# Patient Record
Sex: FEMALE | Race: WHITE | NOT HISPANIC OR LATINO
[De-identification: names, ages, dates, MRNs, and addresses within clinical notes are randomized per-mention and may not be internally consistent; named-entity substitution may affect disease eponyms.]

---

## 2021-04-02 PROBLEM — Z00.00 ENCOUNTER FOR PREVENTIVE HEALTH EXAMINATION: Status: ACTIVE | Noted: 2021-04-02

## 2021-04-06 ENCOUNTER — APPOINTMENT (OUTPATIENT)
Dept: PLASTIC SURGERY | Facility: CLINIC | Age: 61
End: 2021-04-06
Payer: SELF-PAY

## 2021-04-06 VITALS
DIASTOLIC BLOOD PRESSURE: 80 MMHG | HEIGHT: 66 IN | RESPIRATION RATE: 15 BRPM | WEIGHT: 154 LBS | SYSTOLIC BLOOD PRESSURE: 118 MMHG | TEMPERATURE: 97.7 F | HEART RATE: 78 BPM | OXYGEN SATURATION: 97 % | BODY MASS INDEX: 24.75 KG/M2

## 2021-04-06 DIAGNOSIS — F41.9 ANXIETY DISORDER, UNSPECIFIED: ICD-10-CM

## 2021-04-06 DIAGNOSIS — Z84.89 FAMILY HISTORY OF OTHER SPECIFIED CONDITIONS: ICD-10-CM

## 2021-04-06 PROCEDURE — 99203 OFFICE O/P NEW LOW 30 MIN: CPT | Mod: 25

## 2021-04-06 RX ORDER — BIOTIN 10 MG
TABLET ORAL
Refills: 0 | Status: ACTIVE | COMMUNITY

## 2021-04-06 NOTE — SURGICAL HISTORY
[de-identified] : 1992 - Left breast reduction for symmetry done in [place] [de-identified] : 1992 - RT foot bunionectomy done in [place] [de-identified] : 1994 -  section done in [place] [de-identified] : 1995 - LT knee repair of torn ACL done in [place] [de-identified] :  -  section done in [place]

## 2021-04-06 NOTE — ASSESSMENT
[FreeTextEntry1] : Aging face.\par We discussed the R/B/A of Botos injections, including but not limited to ptosis, difficulty speaking and eating and bruising. She gave her informed consent and wished to proceed today.\par Botox injected today.\par Will plan filler to the lateral eye depressions and upper lip border on the next visit to avoid having the filler displace by muscle movement.\par Would see pt on 4/20, however, pt notes she has her second vaccine for COVID on that day. Because the vaccine response can lead to swelling at filler sites, I would delay that visit another 2 weeks.

## 2021-04-06 NOTE — PROCEDURE
[Tolerated Well] : Post Procedure: the patient tolerated the procedure well [FreeTextEntry1] : Aging face [FreeTextEntry2] : Botulinum toxin injection [FreeTextEntry3] : ice packs [FreeTextEntry4] : none [FreeTextEntry5] : none [FreeTextEntry6] : After ice pack analgesia and alcohol prep, the forehead, crow's feet and upper lip were treated.\par Forehead about 17 units\par Crow's feet about 9 units on each side\par Upper lip about 4 units divided over 4 sites.\par \par Lot [id number], Exp 2/22 [de-identified] : Some pain with the upper lip injections

## 2021-04-06 NOTE — REASON FOR VISIT
[Consultation] : a consultation visit [FreeTextEntry1] : discuss possible Botox/fillers.  Patient's areas of concern are underneath her eyes, her forehead, and upper lip.

## 2021-04-06 NOTE — PHYSICAL EXAM
[NI] : Normal [de-identified] : Bilateral crow's feet, left greater than right.\par Left lateral soft tissue deficit. [de-identified] : Horizontal forehead creases.\par Minimal glabellar creases.\par Vertical upper lip creases, 3 on the left and 2 on the right.

## 2021-05-04 ENCOUNTER — APPOINTMENT (OUTPATIENT)
Dept: PLASTIC SURGERY | Facility: CLINIC | Age: 61
End: 2021-05-04
Payer: SELF-PAY

## 2021-05-04 VITALS
OXYGEN SATURATION: 96 % | WEIGHT: 150 LBS | RESPIRATION RATE: 15 BRPM | TEMPERATURE: 97.3 F | HEART RATE: 69 BPM | DIASTOLIC BLOOD PRESSURE: 64 MMHG | BODY MASS INDEX: 24.11 KG/M2 | SYSTOLIC BLOOD PRESSURE: 106 MMHG | HEIGHT: 66 IN

## 2021-05-04 PROCEDURE — 11950 SUBQ NJX FILLING MATRL 1CC/<: CPT

## 2021-05-07 RX ORDER — FLUOCINONIDE 0.5 MG/ML
0.05 SOLUTION TOPICAL
Qty: 60 | Refills: 0 | Status: ACTIVE | COMMUNITY
Start: 2021-03-31

## 2021-05-07 RX ORDER — ALPRAZOLAM 0.5 MG/1
0.5 TABLET ORAL
Qty: 30 | Refills: 0 | Status: ACTIVE | COMMUNITY
Start: 2021-03-29

## 2021-05-07 RX ORDER — VALACYCLOVIR 500 MG/1
500 TABLET, FILM COATED ORAL
Qty: 30 | Refills: 0 | Status: ACTIVE | COMMUNITY
Start: 2021-03-30

## 2021-05-07 RX ORDER — ESTRADIOL 0.1 MG/G
0.1 CREAM VAGINAL
Qty: 42 | Refills: 0 | Status: ACTIVE | COMMUNITY
Start: 2021-04-14

## 2021-05-07 RX ORDER — CLOTRIMAZOLE AND BETAMETHASONE DIPROPIONATE 10; .5 MG/G; MG/G
1-0.05 CREAM TOPICAL
Qty: 45 | Refills: 0 | Status: ACTIVE | COMMUNITY
Start: 2021-03-29

## 2021-05-07 NOTE — PROCEDURE
[FreeTextEntry1] : perioral rhytids, facial aging [FreeTextEntry2] : Upper lip Juvederm injection, 1 ml [FreeTextEntry3] : infra orbital nerve and lip block, lidocaine 1% with epi [FreeTextEntry6] : Following informed consent, an upper lip block was performed, targeting infraorbital branches to the lip.\par Upper lip lines improved following Botox, but creases still present.\par After alcohol swab prep, Juvederm ultra was injected along the white roll bilaterally and at the Cupid's bow peaks. The creased rhytids were injected intradermally in a linear fashion within the crease, and in a deep dermal plane, perpendicular to the crease in a cross-hatching pattern. The commissures were also raised with deep dermal and SQ injection in the lateral lower lip/inferior aspect of the commissure. \par The pt tolerated this well, especially with the block in the upper lip.\par \par No bruising or complications noted.\par Pt also reports she did have a bit of lip weakness, with difficulty saying "p" and "b" sounds for the first week or so after the Botox, but this resolved as expected.\par Follow up in 2-3 months to refresh Botox and check fillers.\par \par Juvederm Ultra XC 1 ml\par GTIN [id number]\par Lot [id number]\par Exp 2021-07-09\par

## 2021-05-07 NOTE — REASON FOR VISIT
[Procedure: _________] : a [unfilled] procedure visit [FreeTextEntry1] : Procedure for filler injections around mouth.

## 2021-05-11 ENCOUNTER — RESULT REVIEW (OUTPATIENT)
Age: 61
End: 2021-05-11

## 2021-05-11 ENCOUNTER — APPOINTMENT (OUTPATIENT)
Dept: BREAST CENTER | Facility: CLINIC | Age: 61
End: 2021-05-11
Payer: MEDICAID

## 2021-05-11 VITALS
WEIGHT: 158 LBS | TEMPERATURE: 97.3 F | OXYGEN SATURATION: 96 % | DIASTOLIC BLOOD PRESSURE: 60 MMHG | HEIGHT: 66 IN | BODY MASS INDEX: 25.39 KG/M2 | RESPIRATION RATE: 15 BRPM | SYSTOLIC BLOOD PRESSURE: 94 MMHG | HEART RATE: 70 BPM

## 2021-05-11 DIAGNOSIS — Z84.89 FAMILY HISTORY OF OTHER SPECIFIED CONDITIONS: ICD-10-CM

## 2021-05-11 PROCEDURE — 99072 ADDL SUPL MATRL&STAF TM PHE: CPT

## 2021-05-11 PROCEDURE — 99203 OFFICE O/P NEW LOW 30 MIN: CPT

## 2021-05-11 NOTE — DATA REVIEWED
[FreeTextEntry1] : 8/13/20, Daylin CLAIRE sMMG: het dense, no susp findings, No birads given.\par 3/30/21, DOUGLAS CLAIRE dMMG: het dense, approx 10-12:00 position close to nipple evidence for a focal asymm grafts 7 or 8mm dev from last mmg, BR3\par 3/30/21, DOUGLAS CLAIRE U/S: 8mm well circumscribed hypoechoic solid nodule 11:00 1N likely FA, area of interest, 2:00 4mm cyst 1N, 6:00 cluster of cysts 8mm 1N, BR3

## 2021-05-11 NOTE — CONSULT LETTER
[Dear  ___] : Dear  [unfilled], [Consult Letter:] : I had the pleasure of evaluating your patient, [unfilled]. [Please see my note below.] : Please see my note below. [Consult Closing:] : Thank you very much for allowing me to participate in the care of this patient.  If you have any questions, please do not hesitate to contact me. [___] : [unfilled]

## 2021-05-11 NOTE — PAST MEDICAL HISTORY
[Menarche Age ____] : age at menarche was [unfilled] [Menopause Age____] : age at menopause was [unfilled] [Total Preg ___] : G[unfilled] [Age At Live Birth ___] : Age at live birth: [unfilled] [History of Hormone Replacement Treatment] : has no history of hormone replacement treatment [FreeTextEntry7] : 10 years [FreeTextEntry8] : ~ 3 mos. each child

## 2021-05-11 NOTE — HISTORY OF PRESENT ILLNESS
[FreeTextEntry1] : Pt is a 61 year old whtie female referred for consultation by Viviane Hardy for abnormal U/S.\par MATT Chau (PCP)\par \par 8/13/20, Daylin CLAIRE sMMG: het dense, no susp findings, Negative, NO birads given.\par 3/30/21, DOUGLAS CLAIRE dMMG: het dense, approx 10-12:00 position close to nipple evidence for a focal asymm grafts 7 or 8mm dev from last mmg, BR3\par 3/30/21, ALETHEA, R U/S: 8mm well circumscribed hypoechoic solid nodule 11:00 1N likely FA, area of interest, 2:00 4mm cyst 1N, 6:00 cluster of cysts 8mm 1N, BR3\par \par Hx of L breast reduction due to asymmetry in [place] 1999.\par Fhx: Sister w/ inoperable brain tumor at 34 y/o. Not AJ. \par Pt felt nodular area at 11:00, a couple mos ago. ALso hx of right breast rash at the same time which has since resolved.  Denies other masses, lesions or skin changes.

## 2021-05-11 NOTE — PHYSICAL EXAM
[Normocephalic] : normocephalic [Atraumatic] : atraumatic [Supple] : supple [No Supraclavicular Adenopathy] : no supraclavicular adenopathy [No Thyromegaly] : no thyromegaly [Examined in the supine and seated position] : examined in the supine and seated position [No dominant masses] : no dominant masses in right breast  [No dominant masses] : no dominant masses left breast [No Nipple Retraction] : no left nipple retraction [No Nipple Discharge] : no left nipple discharge [No Axillary Lymphadenopathy] : no left axillary lymphadenopathy [No Edema] : no edema [No Rashes] : no rashes [No Ulceration] : no ulceration [Symmetrical] : symmetrical [No Swelling] : no swelling [Full ROM] : full range of motion [de-identified] : Left breast reduction scars.  [de-identified] : 11:00 1N, slightly nodular but no discrete masses, could corresp to the prob FA on u/s.  [de-identified] : Left reduction scars.

## 2021-05-11 NOTE — ASSESSMENT
[FreeTextEntry1] : Pt is a 61 year old whtie female referred for consultation by Viviane Hardy for abnormal U/S.\par MATT Chau (PCP)\par \par 8/13/20, ALETHEA, Daylin sMMG: het dense, no susp findings, Negative, NO birads given.\par 3/30/21, ALETHEA R dMMG: het dense, approx 10-12:00 position close to nipple evidence for a focal asymm grafts 7 or 8mm dev from last mmg, BR3\par 3/30/21, ALETHEA, R U/S: 8mm well circumscribed hypoechoic solid nodule 11:00 1N likely FA, area of interest, 2:00 4mm cyst 1N, 6:00 cluster of cysts 8mm 1N, BR3\par \par Hx of L breast reduction due to asymmetry in [place] 1999.\par Fhx: Sister w/ inoperable brain tumor at 32 y/o. Not AJ. \par Pt felt nodular area at 11:00, a couple mos ago. ALso hx of right breast rash at the same time which has since resolved.  Denies other masses, lesions or skin changes. \par CBE: Bilat FCC, Left reduction scar, RIght 11:00 slightly nodular area may correspond with prob FA on u/s. No discrete suspicious masses or lesions and no adenopathy.\par Reviewed studies with pt. PT with dense breasts and did not have left [place]/s. ALso on right prob benign lesions, BR3. Option given for f.u (can do at same time as screenings, due 8/21) or biopsy. Pt opts for the f.u. ALso will review recent u/s with radiology.\par

## 2021-06-29 NOTE — HISTORY OF PRESENT ILLNESS
[facility]  [address] [address]    Call us at [phone] if you have any questions about your wounds, have redness or swelling around your wound, have a fever of 101 or greater or if you have any other problems or concerns. We answer the phone Monday through Friday 8 am to 4 pm, please leave a message as we check the voicemail frequently throughout the day.     Nabil Cheung      1958    Key to healing your wound is to keeping your blood sugars under good control.  Medications/supplements to aid in healing:  Vitamin D3 10,000 iu per day  Ping C 1,000 mg take twice daily  Vitamin B Complex with zinc take 1 tablet daily  Vitamin B 12 1000 mcg daily  ----->Order from [facility] Hesperidin Diosmin 1,000 mg tablet twice daily - Vein Formula- take life long    Wound care recommendations to Left lateral Foot  Apply Endoform antimicrobial then mepilex to wound bed followed by spandage. Then apply total contact cast (TCC)    When you sit raise your ankle above your hips to promote wound healing.  You need to stay off your foot at all times to help heal your wound.  You should wear your offloading shoe at all times.  Use Knee roller to keep your weight off your foot.       Monica Hawk PA-C June 29, 2021    Return as scheduled  If you had a positive experience please indicate on your patient satisfaction survey form that [facility] will be sending you.    If you have any billing related questions please call the [facility] Business office at [phone]. The clinic staff does not handle billing related matters.     [FreeTextEntry1] : This is a 60 yo woman with no major PMH who presents for concerns regarding wrinkles on the face.\par She is most concerned with horizontal forehead lines, crow's feet and left lateral eye vertical line as well as vertical lines in her upper lip.\par She has had Botox before, 3 times in her forehead and once in her upper lip.\par The second time she had forehead Botox, she noticed a change in shape of her eyes that she did not like (ptosis?).\par When she had Botox in the lip she noticed difficulty eating a hot dog and it may have affected her speech.\par She is not sure if she ever had fillers.\par She is not a smoker, but she does note that she drank through straws frequently while she was working.\par She is retired now.

## 2021-07-22 ENCOUNTER — NON-APPOINTMENT (OUTPATIENT)
Age: 61
End: 2021-07-22

## 2021-07-22 ENCOUNTER — APPOINTMENT (OUTPATIENT)
Dept: OPHTHALMOLOGY | Facility: CLINIC | Age: 61
End: 2021-07-22
Payer: MEDICAID

## 2021-07-22 PROCEDURE — 92020 GONIOSCOPY: CPT

## 2021-07-22 PROCEDURE — 92014 COMPRE OPH EXAM EST PT 1/>: CPT

## 2021-09-08 ENCOUNTER — RESULT REVIEW (OUTPATIENT)
Age: 61
End: 2021-09-08

## 2021-09-08 ENCOUNTER — APPOINTMENT (OUTPATIENT)
Dept: ULTRASOUND IMAGING | Facility: CLINIC | Age: 61
End: 2021-09-08

## 2021-09-08 ENCOUNTER — APPOINTMENT (OUTPATIENT)
Dept: MAMMOGRAPHY | Facility: CLINIC | Age: 61
End: 2021-09-08
Payer: MEDICAID

## 2021-09-08 PROCEDURE — 76641 ULTRASOUND BREAST COMPLETE: CPT | Mod: 50

## 2021-09-08 PROCEDURE — G0279: CPT

## 2021-09-08 PROCEDURE — 77066 DX MAMMO INCL CAD BI: CPT

## 2021-10-05 ENCOUNTER — APPOINTMENT (OUTPATIENT)
Dept: PLASTIC SURGERY | Facility: CLINIC | Age: 61
End: 2021-10-05
Payer: SELF-PAY

## 2021-10-05 VITALS
TEMPERATURE: 98.4 F | WEIGHT: 155.9 LBS | BODY MASS INDEX: 25.05 KG/M2 | HEART RATE: 73 BPM | RESPIRATION RATE: 16 BRPM | OXYGEN SATURATION: 96 % | HEIGHT: 66 IN | SYSTOLIC BLOOD PRESSURE: 110 MMHG | DIASTOLIC BLOOD PRESSURE: 60 MMHG

## 2021-10-05 PROCEDURE — 64612 DESTROY NERVE FACE MUSCLE: CPT

## 2021-10-05 RX ORDER — LORATADINE 10 MG/1
TABLET ORAL
Refills: 0 | Status: DISCONTINUED | COMMUNITY
End: 2021-10-05

## 2021-10-05 RX ORDER — ESCITALOPRAM OXALATE 10 MG/1
10 TABLET, FILM COATED ORAL
Refills: 0 | Status: DISCONTINUED | COMMUNITY
End: 2021-10-05

## 2021-10-05 RX ORDER — FLUTICASONE PROPIONATE 50 MCG
SPRAY, SUSPENSION NASAL
Refills: 0 | Status: ACTIVE | COMMUNITY

## 2021-10-05 RX ORDER — BIOTIN 10 MG
TABLET ORAL
Refills: 0 | Status: DISCONTINUED | COMMUNITY
End: 2021-10-05

## 2021-10-05 NOTE — REASON FOR VISIT
[Follow-Up: _____] : a [unfilled] follow-up visit [FreeTextEntry1] : discuss botox/filler injections.  Botox LOT: [id number] EXP: 10/2023

## 2021-10-05 NOTE — PHYSICAL EXAM
[NI] : Normal [de-identified] : Bilateral crow's feet.\par right lower lid with a smoother lid-cheek junction than the left. [de-identified] : Horizontal forehead creases, will lower forehead.\par Vertical Glabellar creases, moderate.\par Upper lip rhytids, mild to moderate.

## 2021-10-05 NOTE — HISTORY OF PRESENT ILLNESS
[FreeTextEntry1] : Pt reports she loved the results she achieved with the lip wrinkles after her prior treatment (botox first, then filler about one month later). She felt her eye shape did change a bit (less than previous) with the forehead Botox. She would still like to address the forehead wrinkles, though.

## 2021-10-05 NOTE — ASSESSMENT
[FreeTextEntry1] : Facial Botox done today (forehead, crow's feet and upper lip). \par Plan for Juvederm to the upper lip in two weeks with bilateral infraorbital nerve block.\par Pt to monitor eye shape after forehead Botox.\par Call for any problems or concerns.

## 2021-10-05 NOTE — PROCEDURE
[FreeTextEntry1] : Aging face, upper lip rhytids [FreeTextEntry2] : Botulinum toxin injection [FreeTextEntry6] : After ice pack analgesia and alcohol prep, the forehead, crow's feet and upper lip were treated.\par Forehead about 15 units\par Crow's feet about 9 units on each side\par Upper lip about 4 units divided over 4 sites.\par \par LOT: [id number] EXP: 10/2023. \par \par Pt tolerated the procedure well.\par No bleeding or bruising noted.

## 2021-10-19 ENCOUNTER — APPOINTMENT (OUTPATIENT)
Dept: PLASTIC SURGERY | Facility: CLINIC | Age: 61
End: 2021-10-19
Payer: SELF-PAY

## 2021-10-19 VITALS
SYSTOLIC BLOOD PRESSURE: 98 MMHG | HEART RATE: 72 BPM | TEMPERATURE: 97.6 F | HEIGHT: 66 IN | DIASTOLIC BLOOD PRESSURE: 70 MMHG | OXYGEN SATURATION: 98 % | WEIGHT: 154.13 LBS | BODY MASS INDEX: 24.77 KG/M2 | RESPIRATION RATE: 18 BRPM

## 2021-10-19 PROCEDURE — 11950 SUBQ NJX FILLING MATRL 1CC/<: CPT

## 2021-10-19 NOTE — PROCEDURE
[Nl] : None [FreeTextEntry1] : perioral rhytids, facial aging  [FreeTextEntry2] : Upper lip Juvederm injection, one syringe [FreeTextEntry6] : Following informed consent, an upper lip block was performed, targeting infraorbital branches to the lip.\par Upper lip lines improved following Botox, but creases still present.\par After alcohol swab prep, Juvederm ultra was injected along the white roll bilaterally up to the Cupid's bow peaks. The creased rhytids were injected intradermally in a linear fashion within the crease, and in a deep dermal plane, perpendicular to the crease in a cross-hatching pattern. \par The pt tolerated this well, especially with the block in the upper lip.\par \par No bruising or complications noted.\par Pt also reports she did have a bit of lip weakness especially with eating a burger, but no difficulty saying "p" and "b" sounds.\par Follow up in 2-3 months to refresh Botox and check fillers.\par Juvederm Ultra XC 1 ml\par GTIN [id number]\par Lot [id number]\par Exp 2022-06-23\par

## 2021-11-09 ENCOUNTER — RESULT REVIEW (OUTPATIENT)
Age: 61
End: 2021-11-09

## 2021-11-09 ENCOUNTER — APPOINTMENT (OUTPATIENT)
Age: 61
End: 2021-11-09
Payer: MEDICAID

## 2021-11-09 VITALS
BODY MASS INDEX: 24.59 KG/M2 | TEMPERATURE: 97.7 F | HEART RATE: 66 BPM | DIASTOLIC BLOOD PRESSURE: 62 MMHG | WEIGHT: 153 LBS | HEIGHT: 66 IN | SYSTOLIC BLOOD PRESSURE: 99 MMHG

## 2021-11-09 DIAGNOSIS — R92.8 OTHER ABNORMAL AND INCONCLUSIVE FINDINGS ON DIAGNOSTIC IMAGING OF BREAST: ICD-10-CM

## 2021-11-09 PROCEDURE — 99213 OFFICE O/P EST LOW 20 MIN: CPT

## 2021-11-09 NOTE — HISTORY OF PRESENT ILLNESS
[FreeTextEntry1] : Pt is a 61 year old whtie female referred for consultation by Viviane Hardy for abnormal U/S.\par MATT Chau (PCP)\par \par 8/13/20, ELIH, Bilat sMMG: het dense, no susp findings, Negative, NO birads given.\par 3/30/21, ELIH, R dMMG: het dense, approx 10-12:00 position close to nipple evidence for a focal asymm grafts 7 or 8mm dev from last mmg, BR3\par 3/30/21, ELIH, R U/S: 8mm well circumscribed hypoechoic solid nodule 11:00 1N likely FA, area of interest, 2:00 4mm cyst 1N, 6:00 cluster of cysts 8mm 1N, BR3\par \par 9/8/21: NFI: Bilat MMG: FG density, stable right SA nodule. \par 9/8/21 NFI: Bilat u/s, Right 8x6 mm nodule at 11:00 1N stable. Adj 3 mm nodule, likely stable. Scattered subcm cyst and FC nodules. Left" 7x3 mm 2:00 4N and 4:00 4N nodules. Bilat breast nodularity, BR3- bilat mmg and u/s.  Rec comparison with prior Left mmg and bilat u/s. \par \par Hx of L breast reduction due to asymmetry in [place] 1999.\par Fhx: Sister w/ inoperable brain tumor at 32 y/o. Not AJ. \par Pt felt nodular area at 11:00, a couple mos ago. ALso hx of right breast rash at the same time which has since resolved. Denies other masses, lesions or skin changes. \par

## 2021-11-09 NOTE — PHYSICAL EXAM
[Normocephalic] : normocephalic [Atraumatic] : atraumatic [Supple] : supple [No Supraclavicular Adenopathy] : no supraclavicular adenopathy [Examined in the supine and seated position] : examined in the supine and seated position [Asymmetrical] : asymmetrical [No dominant masses] : no dominant masses in right breast  [No dominant masses] : no dominant masses left breast [No Nipple Retraction] : no left nipple retraction [No Nipple Discharge] : no left nipple discharge [No Axillary Lymphadenopathy] : no left axillary lymphadenopathy [No Edema] : no edema [No Swelling] : no swelling [Full ROM] : full range of motion [No Rashes] : no rashes [No Ulceration] : no ulceration [de-identified] : stable 11:00 1N nodularity corresp to u/s finding [de-identified] : left reduction scar

## 2021-11-09 NOTE — ASSESSMENT
[FreeTextEntry1] : Pt is a 61 year old whtie female referred for consultation by Viviane Hardy for abnormal U/S.\par MATT Chau (PCP)\par \par 8/13/20, ELIH, Bilat sMMG: het dense, no susp findings, Negative, NO birads given.\par 3/30/21, ELIH, R dMMG: het dense, approx 10-12:00 position close to nipple evidence for a focal asymm grafts 7 or 8mm dev from last mmg, BR3\par 3/30/21, ELIH, R U/S: 8mm well circumscribed hypoechoic solid nodule 11:00 1N likely FA, area of interest, 2:00 4mm cyst 1N, 6:00 cluster of cysts 8mm 1N, BR3\par \par 9/8/21: NFI: Bilat MMG: FG density, stable right SA nodule. \par 9/8/21 NFI: Bilat u/s, Right 8x6 mm nodule at 11:00 1N stable. Adj 3 mm nodule, likely stable. Scattered subcm cyst and FC nodules. Left" 7x3 mm 2:00 4N and 4:00 4N nodules. Bilat breast nodularity, BR3- bilat mmg and u/s.  Rec comparison with prior Left mmg and bilat u/s. \par \par Hx of L breast reduction due to asymmetry in [place] 1999.\par Fhx: Sister w/ inoperable brain tumor at 34 y/o. Not AJ. \par Pt felt nodular area at 11:00, a couple mos ago. ALso hx of right breast rash at the same time which has since resolved. Denies other masses, lesions or skin changes. \par CBE: Bilat FCC, Left reduction scar, RIght 11:00 slightly nodular area may correspond with prob FA on u/s. No discrete suspicious masses or lesions and no adenopathy.\par Reviewed studies with pt. BR3 for bilat mmg and u/s and nodularity. Due 3/22.  Discussed Right nodule is stable per size of report. Option given for biopsy but discussed not necessary since prob stable. PT will get the f.u. PT did not bring her RIght prior u/s in at time of screening. She may have it at home and will drop it off at the [place] office. There are no prior left u/s. \par

## 2021-11-09 NOTE — PAST MEDICAL HISTORY
[Menarche Age ____] : age at menarche was [unfilled] [Menopause Age____] : age at menopause was [unfilled] [History of Hormone Replacement Treatment] : has no history of hormone replacement treatment [Total Preg ___] : G[unfilled] [Age At Live Birth ___] : Age at live birth: [unfilled] [FreeTextEntry7] : 10 years [FreeTextEntry8] : ~ 3 mos. each child

## 2021-11-09 NOTE — CONSULT LETTER
[Dear  ___] : Dear  [unfilled], [Consult Letter:] : I had the pleasure of evaluating your patient, [unfilled]. [Please see my note below.] : Please see my note below. [Consult Closing:] : Thank you very much for allowing me to participate in the care of this patient.  If you have any questions, please do not hesitate to contact me. [Sincerely,] : Sincerely, [FreeTextEntry3] : Heidi Damon MD

## 2021-11-09 NOTE — DATA REVIEWED
[FreeTextEntry1] : 9/8/21: NFI: Bilat MMG: FG density, stable right SA nodule. \par 9/8/21 NFI: Bilat u/s, Right 8x6 mm nodule at 11:00 1N stable. Adj 3 mm nodule, likely stable. Scattered subcm cyst and FC nodules. Left" 7x3 mm 2:00 4N and 4:00 4N nodules. Bilat breast nodularity, BR3- bilat mmg and u/s.  Rec comparison with prior Left mmg and bilat u/s. \par

## 2022-01-27 ENCOUNTER — APPOINTMENT (OUTPATIENT)
Dept: OPHTHALMOLOGY | Facility: CLINIC | Age: 62
End: 2022-01-27
Payer: MEDICAID

## 2022-01-27 ENCOUNTER — NON-APPOINTMENT (OUTPATIENT)
Age: 62
End: 2022-01-27

## 2022-01-27 PROCEDURE — 92012 INTRM OPH EXAM EST PATIENT: CPT

## 2022-01-27 PROCEDURE — 92020 GONIOSCOPY: CPT

## 2022-03-25 ENCOUNTER — APPOINTMENT (OUTPATIENT)
Dept: MAMMOGRAPHY | Facility: CLINIC | Age: 62
End: 2022-03-25
Payer: MEDICAID

## 2022-03-25 ENCOUNTER — RESULT REVIEW (OUTPATIENT)
Age: 62
End: 2022-03-25

## 2022-03-25 ENCOUNTER — APPOINTMENT (OUTPATIENT)
Dept: ULTRASOUND IMAGING | Facility: CLINIC | Age: 62
End: 2022-03-25
Payer: MEDICAID

## 2022-03-25 PROCEDURE — 76642 ULTRASOUND BREAST LIMITED: CPT | Mod: 50

## 2022-03-25 PROCEDURE — G0279: CPT

## 2022-03-25 PROCEDURE — 77066 DX MAMMO INCL CAD BI: CPT

## 2022-04-08 ENCOUNTER — NON-APPOINTMENT (OUTPATIENT)
Age: 62
End: 2022-04-08

## 2022-05-17 ENCOUNTER — APPOINTMENT (OUTPATIENT)
Dept: PLASTIC SURGERY | Facility: CLINIC | Age: 62
End: 2022-05-17
Payer: SELF-PAY

## 2022-05-17 VITALS
BODY MASS INDEX: 25.18 KG/M2 | DIASTOLIC BLOOD PRESSURE: 70 MMHG | RESPIRATION RATE: 19 BRPM | SYSTOLIC BLOOD PRESSURE: 100 MMHG | TEMPERATURE: 97.1 F | OXYGEN SATURATION: 98 % | HEART RATE: 80 BPM | WEIGHT: 156 LBS

## 2022-05-17 PROCEDURE — 64612 DESTROY NERVE FACE MUSCLE: CPT

## 2022-05-17 NOTE — ASSESSMENT
[FreeTextEntry1] : Plan:\par Return in 3 weeks for upper lip Juvederm with bilateral infraorbital nerve block.\par \par Pt is also considering lower lip filler for creases (not for augmentation). I would recommend mental nerve blocks for this. It should be done on a separate day from the upper lip, so both lips are not blocked at once.\par \par Right upper lid lesion likely a keratotic lesion. Rec derm eval. Can be excised if desired.

## 2022-05-17 NOTE — PROCEDURE
[FreeTextEntry1] : Aging face, upper lip rhytids [FreeTextEntry2] : Botulinum toxin injection [FreeTextEntry6] : After ice pack analgesia and alcohol prep, the forehead, crow's feet and upper lip were treated.\par Forehead  at 5 sites, about 17 units\par Crow's feet about 9 units on each side (3 sites each side)\par Upper lip about 4 units divided over 4 sites.\par \par Pt tolerated the procedure well.\par No bleeding or bruising. \par \par LOT: [id number]  EXP: 08/2023.

## 2022-05-17 NOTE — PHYSICAL EXAM
[NI] : Normal [de-identified] : Forehead with transverse rhytids.\par Lips with mild to moderate creases. \par Upper lip is improved since her first presentation.\par Crow's feet bilaterally.

## 2022-05-17 NOTE — HISTORY OF PRESENT ILLNESS
[FreeTextEntry1] : Pt feels like what we have been doing is working and she really likes how her upper lip is looking better.\par \par \par She also asked about a right upper eyelid lesion. She says it is dry and she picks and scrapes at it, but it doesn't go away.

## 2022-06-07 ENCOUNTER — APPOINTMENT (OUTPATIENT)
Dept: PLASTIC SURGERY | Facility: CLINIC | Age: 62
End: 2022-06-07
Payer: SELF-PAY

## 2022-06-07 VITALS
RESPIRATION RATE: 19 BRPM | HEART RATE: 73 BPM | WEIGHT: 158 LBS | SYSTOLIC BLOOD PRESSURE: 120 MMHG | TEMPERATURE: 97.3 F | HEIGHT: 66 IN | DIASTOLIC BLOOD PRESSURE: 80 MMHG | OXYGEN SATURATION: 96 % | BODY MASS INDEX: 25.39 KG/M2

## 2022-06-07 PROCEDURE — 11950 SUBQ NJX FILLING MATRL 1CC/<: CPT

## 2022-06-07 NOTE — REASON FOR VISIT
[Procedure: _________] : a [unfilled] procedure visit [FreeTextEntry1] : Procedure for filler around mouth.

## 2022-06-07 NOTE — PROCEDURE
[Nl] : Local Anesthesia: (1% Lidocaine with 1:100,000 epinephrine) [FreeTextEntry1] : Perioral rhytids, facial aging [FreeTextEntry2] : Upper lip Juvederm injection, one syringe [FreeTextEntry3] : Bilateral Infra-orbital nerve blocks [FreeTextEntry4] : None [FreeTextEntry5] : None [FreeTextEntry6] : Following informed consent, bilateral infraorbital nerve blocks were performed with good results except the midline. Both phitral columns were anesthetized well; it was just the rhytid at the midline that was sensate.\par Upper lip lines improved following Botox, but some creases were still present.\par After alcohol swab prep, Juvederm ultra was injected along the white roll bilaterally up to the Cupid's bow peaks. The creased rhytids were injected intradermally in a linear fashion within the crease, and in a deep dermal plane, perpendicular to the crease in a cross-hatching pattern. The central rhytid was injected directly as well.\par The pt tolerated this well, especially with the block in the upper lip.\par \par No bruising or complications noted.\par \par Follow up in 2-3 months to refresh Botox and check fillers.\par Juvederm Ultra XC 1 ml\par GTIN [id number]\par Lot [id number]\par Exp 2022-10-04

## 2022-07-08 ENCOUNTER — APPOINTMENT (OUTPATIENT)
Dept: OPHTHALMOLOGY | Facility: CLINIC | Age: 62
End: 2022-07-08

## 2022-09-13 ENCOUNTER — APPOINTMENT (OUTPATIENT)
Dept: BREAST CENTER | Facility: CLINIC | Age: 62
End: 2022-09-13

## 2022-09-13 VITALS
HEIGHT: 66 IN | HEART RATE: 72 BPM | BODY MASS INDEX: 25.39 KG/M2 | WEIGHT: 158 LBS | SYSTOLIC BLOOD PRESSURE: 95 MMHG | TEMPERATURE: 97.3 F | DIASTOLIC BLOOD PRESSURE: 60 MMHG

## 2022-09-13 DIAGNOSIS — R92.8 OTHER ABNORMAL AND INCONCLUSIVE FINDINGS ON DIAGNOSTIC IMAGING OF BREAST: ICD-10-CM

## 2022-09-13 PROCEDURE — 99213 OFFICE O/P EST LOW 20 MIN: CPT

## 2022-09-13 NOTE — DATA REVIEWED
[FreeTextEntry1] : 3/25/22, NFR, Bilat dMMG: FG, no susp findings, stable bilat nodularity; Bilat U/S: R no susp solid mass, stable subcm nodules at 2:00, 3:00, 6:00 and 11:00, L no susp solid mass, stable subcm nodules 2:00 and 4:00, rec mmg 1 year, BR2\par

## 2022-09-13 NOTE — CONSULT LETTER
[Dear  ___] : Dear  [unfilled], [Consult Letter:] : I had the pleasure of evaluating your patient, [unfilled]. [Please see my note below.] : Please see my note below. [Consult Closing:] : Thank you very much for allowing me to participate in the care of this patient.  If you have any questions, please do not hesitate to contact me. [Sincerely,] : Sincerely, [FreeTextEntry3] : Heidi Damon MD  [DrLeanna  ___] : Dr. THAKKAR [___] : [unfilled]

## 2022-09-13 NOTE — ASSESSMENT
[FreeTextEntry1] : Pt is a 61 year old whtie female here for follow up for abnormal U/S and studies from 3/22. \par MATT Chau (PCP), referred by Viviane Hardy MD.\par \par 9/8/21: NFI: Bilat MMG: FG density, stable right SA nodule. \par 9/8/21 NFI: Bilat u/s, Right 8x6 mm nodule at 11:00 1N stable. Adj 3 mm nodule, likely stable. Scattered subcm cyst and FC nodules. Left" 7x3 mm 2:00 4N and 4:00 4N nodules. Bilat breast nodularity, BR3- bilat mmg and u/s. Rec comparison with prior Left mmg and bilat u/s. \par \par 3/25/22, Compared to 2021 studies, NFR, Bilat dMMG: FG, no susp findings, stable bilat nodularity; Bilat U/S: R no susp solid mass, stable subcm nodules at 2:00, 3:00, 6:00 and 11:00, L no susp solid mass, stable subcm nodules 2:00 and 4:00, rec mmg 1 year, BR2\par \par Hx of L breast reduction due to asymmetry in [place] 1999.\par Fhx: Sister w/ inoperable brain tumor at 32 y/o. Not AJ. \par Pt without breast complaints. \par \par CBE: Bilat FCC, Left reduction scar, RIght 11:00 slightly nodular area less obvious today. No discrete suspicious masses or lesions and no adenopathy.\par Reviewed studies with pt from 3/25/22, was compared to priors and bilat stable nodules. Pt is not high risk, rec is for annual mgm and u/s 3/23, may resume screenings with PCP/GYN. F.u with us if breast issues as needed. \par

## 2022-09-13 NOTE — HISTORY OF PRESENT ILLNESS
[FreeTextEntry1] : Pt is a 61 year old whtie female here for follow up for abnormal U/S and studies from 3/22. \par MATT Chau (PCP), referred by Viviane Hardy MD.\par \par 9/8/21: NFI: Bilat MMG: FG density, stable right SA nodule. \par 9/8/21 NFI: Bilat u/s, Right 8x6 mm nodule at 11:00 1N stable. Adj 3 mm nodule, likely stable. Scattered subcm cyst and FC nodules. Left" 7x3 mm 2:00 4N and 4:00 4N nodules. Bilat breast nodularity, BR3- bilat mmg and u/s. Rec comparison with prior Left mmg and bilat u/s. \par \par 3/25/22, NFR, Bilat dMMG: FG, no susp findings, stable bilat nodularity; Bilat U/S: R no susp solid mass, stable subcm nodules at 2:00, 3:00, 6:00 and 11:00, L no susp solid mass, stable subcm nodules 2:00 and 4:00, rec mmg 1 year, BR2\par \par Hx of L breast reduction due to asymmetry in [place] 1999.\par Fhx: Sister w/ inoperable brain tumor at 32 y/o. Not AJ. \par Pt without breast complaints. Pt denies any breast lesions, discharge or masses.\par \par

## 2022-09-13 NOTE — PHYSICAL EXAM
[Normocephalic] : normocephalic [Atraumatic] : atraumatic [Supple] : supple [No Supraclavicular Adenopathy] : no supraclavicular adenopathy [Examined in the supine and seated position] : examined in the supine and seated position [Symmetrical] : symmetrical [No dominant masses] : no dominant masses in right breast  [No dominant masses] : no dominant masses left breast [No Nipple Retraction] : no left nipple retraction [No Nipple Discharge] : no left nipple discharge [No Axillary Lymphadenopathy] : no left axillary lymphadenopathy [No Edema] : no edema [No Swelling] : no swelling [Full ROM] : full range of motion [No Rashes] : no rashes [No Ulceration] : no ulceration [de-identified] : No suspicious masses, nodular area on right not as obvious. [de-identified] : reduction scars.

## 2022-11-07 ENCOUNTER — APPOINTMENT (OUTPATIENT)
Dept: PLASTIC SURGERY | Facility: CLINIC | Age: 62
End: 2022-11-07

## 2022-11-07 VITALS
DIASTOLIC BLOOD PRESSURE: 80 MMHG | WEIGHT: 163 LBS | HEIGHT: 66 IN | RESPIRATION RATE: 19 BRPM | BODY MASS INDEX: 26.2 KG/M2 | HEART RATE: 66 BPM | TEMPERATURE: 97.1 F | OXYGEN SATURATION: 99 % | SYSTOLIC BLOOD PRESSURE: 110 MMHG

## 2022-11-07 PROCEDURE — 64612 DESTROY NERVE FACE MUSCLE: CPT

## 2022-11-07 NOTE — REASON FOR VISIT
[Procedure: _________] : a [unfilled] procedure visit [FreeTextEntry1] : Patient states her lips are doing good no complaints.

## 2022-11-07 NOTE — PROCEDURE
[FreeTextEntry1] : Aging face, forehead and crow's feet rhytids [FreeTextEntry2] : Botulinum toxin injection [FreeTextEntry6] : After careful CHG prep, the forehead, and crow's feet were treated.\par Forehead at 7 sites, about 17 units, split in 5 sites across the mid forehead and two site above that.\par Crow's feet about 9 units on each side (3 sites each side)\par \par Pt tolerated the procedure well.\par No significant bleeding or bruising. Mild bleeding in the right forehead, stopped with pressure.\par \par LOT: [id number] EXP: 08/2023. \par \par \par [id number]\par Exp:08/2023

## 2023-01-12 ENCOUNTER — APPOINTMENT (OUTPATIENT)
Dept: OPHTHALMOLOGY | Facility: CLINIC | Age: 63
End: 2023-01-12
Payer: MEDICAID

## 2023-01-12 ENCOUNTER — NON-APPOINTMENT (OUTPATIENT)
Age: 63
End: 2023-01-12

## 2023-01-12 PROCEDURE — 92012 INTRM OPH EXAM EST PATIENT: CPT

## 2023-01-18 ENCOUNTER — APPOINTMENT (OUTPATIENT)
Dept: OPHTHALMOLOGY | Facility: CLINIC | Age: 63
End: 2023-01-18
Payer: MEDICAID

## 2023-01-18 ENCOUNTER — NON-APPOINTMENT (OUTPATIENT)
Age: 63
End: 2023-01-18

## 2023-01-18 PROCEDURE — 92012 INTRM OPH EXAM EST PATIENT: CPT

## 2023-01-27 ENCOUNTER — APPOINTMENT (OUTPATIENT)
Dept: OPHTHALMOLOGY | Facility: CLINIC | Age: 63
End: 2023-01-27
Payer: MEDICAID

## 2023-01-27 ENCOUNTER — NON-APPOINTMENT (OUTPATIENT)
Age: 63
End: 2023-01-27

## 2023-01-27 PROCEDURE — 99213 OFFICE O/P EST LOW 20 MIN: CPT

## 2023-02-21 ENCOUNTER — APPOINTMENT (OUTPATIENT)
Dept: PLASTIC SURGERY | Facility: CLINIC | Age: 63
End: 2023-02-21
Payer: SELF-PAY

## 2023-02-21 VITALS
OXYGEN SATURATION: 98 % | SYSTOLIC BLOOD PRESSURE: 120 MMHG | HEIGHT: 66 IN | HEART RATE: 67 BPM | TEMPERATURE: 98 F | DIASTOLIC BLOOD PRESSURE: 80 MMHG | RESPIRATION RATE: 19 BRPM

## 2023-02-21 PROCEDURE — 64612 DESTROY NERVE FACE MUSCLE: CPT

## 2023-02-21 NOTE — PROCEDURE
[FreeTextEntry1] : Aging face, perioral rhytids [FreeTextEntry2] : Botulinum toxin injection, upper lip [FreeTextEntry3] : Ice pack [FreeTextEntry4] : Droplets [FreeTextEntry5] : None [FreeTextEntry6] : After ice pack analgesia the upper lip was treated.\par \par Upper lip about 4 units divided over 4 sites.\par \par Lot [id number], Exp 7/24 \par \par Post Procedure: the patient tolerated the procedure well .

## 2023-02-21 NOTE — REASON FOR VISIT
[Procedure: _________] : a [unfilled] procedure visit [FreeTextEntry1] : Patient states she wants Botox on her lips. She is still satisfied with her last Botox procedure.

## 2023-03-06 ENCOUNTER — APPOINTMENT (OUTPATIENT)
Dept: PLASTIC SURGERY | Facility: CLINIC | Age: 63
End: 2023-03-06
Payer: SELF-PAY

## 2023-03-06 VITALS
TEMPERATURE: 97.5 F | OXYGEN SATURATION: 98 % | BODY MASS INDEX: 27 KG/M2 | WEIGHT: 168 LBS | HEART RATE: 73 BPM | HEIGHT: 66 IN | SYSTOLIC BLOOD PRESSURE: 96 MMHG | DIASTOLIC BLOOD PRESSURE: 62 MMHG | RESPIRATION RATE: 18 BRPM

## 2023-03-06 DIAGNOSIS — L98.8 OTHER SPECIFIED DISORDERS OF THE SKIN AND SUBCUTANEOUS TISSUE: ICD-10-CM

## 2023-03-06 PROCEDURE — 11950 SUBQ NJX FILLING MATRL 1CC/<: CPT

## 2023-03-06 NOTE — PROCEDURE
[FreeTextEntry1] : Perioral rhytids, facial aging  [FreeTextEntry2] : Upper lip Juvederm injection, one syringe  [FreeTextEntry3] : Local Anesthesia: (1% Lidocaine with 1:100,000 epinephrine), Bilateral Infra-orbital nerve blocks \par Infra-orbital nerve blocks \par  [FreeTextEntry4] : Droplets [FreeTextEntry5] : None [FreeTextEntry6] : Following informed consent, bilateral infraorbital nerve blocks were performed with good results including the midline. \par Upper lip lines improved following Botox, but some creases were still present.\par After alcohol swab prep, Juvederm ultra was injected along the white roll bilaterally up to the Cupid's bow peaks. The creased rhytids were injected intradermally in a linear fashion within the crease, and in a deep dermal plane, perpendicular to the crease in a cross-hatching pattern. The central rhytid was injected directly as well.\par The pt tolerated this well, especially with the block in the upper lip.\par \par No bruising or complications noted.\par \par Follow up as needed.\par Juvederm Ultra XC\par GTIN [id number]\par Lot [id number]\par Exp 2023-09-16 \par  \par \par

## 2023-03-23 ENCOUNTER — NON-APPOINTMENT (OUTPATIENT)
Age: 63
End: 2023-03-23

## 2023-06-02 ENCOUNTER — APPOINTMENT (OUTPATIENT)
Dept: ULTRASOUND IMAGING | Facility: CLINIC | Age: 63
End: 2023-06-02

## 2023-06-02 ENCOUNTER — APPOINTMENT (OUTPATIENT)
Dept: MAMMOGRAPHY | Facility: CLINIC | Age: 63
End: 2023-06-02
Payer: MEDICAID

## 2023-06-02 PROCEDURE — G0279: CPT

## 2023-06-02 PROCEDURE — 77066 DX MAMMO INCL CAD BI: CPT

## 2023-06-02 PROCEDURE — 76642 ULTRASOUND BREAST LIMITED: CPT | Mod: RT

## 2023-06-12 ENCOUNTER — APPOINTMENT (OUTPATIENT)
Dept: ULTRASOUND IMAGING | Facility: CLINIC | Age: 63
End: 2023-06-12
Payer: MEDICAID

## 2023-06-12 PROCEDURE — 77065 DX MAMMO INCL CAD UNI: CPT | Mod: RT

## 2023-06-12 PROCEDURE — 19083 BX BREAST 1ST LESION US IMAG: CPT | Mod: RT

## 2023-07-07 ENCOUNTER — APPOINTMENT (OUTPATIENT)
Dept: MRI IMAGING | Facility: CLINIC | Age: 63
End: 2023-07-07
Payer: MEDICAID

## 2023-07-07 PROCEDURE — A9585: CPT | Mod: JW

## 2023-07-07 PROCEDURE — 77049 MRI BREAST C-+ W/CAD BI: CPT

## 2023-07-17 ENCOUNTER — APPOINTMENT (OUTPATIENT)
Dept: ULTRASOUND IMAGING | Facility: CLINIC | Age: 63
End: 2023-07-17
Payer: MEDICAID

## 2023-07-17 PROCEDURE — 77065 DX MAMMO INCL CAD UNI: CPT | Mod: RT

## 2023-07-17 PROCEDURE — 19083 BX BREAST 1ST LESION US IMAG: CPT | Mod: RT

## 2023-07-19 ENCOUNTER — APPOINTMENT (OUTPATIENT)
Dept: BREAST CENTER | Facility: CLINIC | Age: 63
End: 2023-07-19
Payer: MEDICAID

## 2023-07-19 VITALS
WEIGHT: 165 LBS | OXYGEN SATURATION: 97 % | SYSTOLIC BLOOD PRESSURE: 115 MMHG | DIASTOLIC BLOOD PRESSURE: 74 MMHG | TEMPERATURE: 97.6 F | BODY MASS INDEX: 26.52 KG/M2 | HEIGHT: 66 IN | HEART RATE: 82 BPM | RESPIRATION RATE: 16 BRPM

## 2023-07-19 DIAGNOSIS — R92.8 OTHER ABNORMAL AND INCONCLUSIVE FINDINGS ON DIAGNOSTIC IMAGING OF BREAST: ICD-10-CM

## 2023-07-19 DIAGNOSIS — R93.89 ABNORMAL FINDINGS ON DIAGNOSTIC IMAGING OF OTHER SPECIFIED BODY STRUCTURES: ICD-10-CM

## 2023-07-19 PROCEDURE — 93702 BIS XTRACELL FLUID ANALYSIS: CPT | Mod: NC

## 2023-07-19 PROCEDURE — 99214 OFFICE O/P EST MOD 30 MIN: CPT | Mod: 25

## 2023-07-19 NOTE — CONSULT LETTER
[Dear  ___] : Dear  [unfilled], [Courtesy Letter:] : I had the pleasure of seeing your patient, [unfilled], in my office today. [Please see my note below.] : Please see my note below. [Consult Closing:] : Thank you very much for allowing me to participate in the care of this patient.  If you have any questions, please do not hesitate to contact me. [Sincerely,] : Sincerely, [___] : [unfilled] [FreeTextEntry3] : Heidi Damon MD

## 2023-07-19 NOTE — PROCEDURE
[FreeTextEntry1] : lymphedema bioimpedence, JONN [FreeTextEntry2] : monitor for lymphedema [FreeTextEntry3] : The patient had a baseline SOZO measurement which I reviewed today. The score is WNL, -2.6, see scanned to EMR. Bioimpedance spectroscopy helps identify the onset of lymphedema in an arm or leg before patients experience noticeable swelling. Research has shown that the early detection of lymphedema using L-Dex combined with treatment can reduce progression to chronic lymphedema by 95% in breast cancer patients. Whenever possible, patients are tested for baseline L-Dex score before cancer treatment begins and then are reassessed during regular follow-up visits using the SOZO device. Otherwise, this can be started postoperatively and continued during regular follow-up visits. If the patient’s L-Dex score increases above normal levels, that is a sign that lymphedema is developing and a referral is made to physical therapy for further evaluation and early compression treatment. Lymphedema assessment with the SOZO L-Dex score is recommended to be done every 3 months for the first 3 years and then every 6 months for years 4 and 5 followed by annually afterwards.\par \par

## 2023-07-19 NOTE — HISTORY OF PRESENT ILLNESS
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 Also saw Dr. Larry in [place] and scheduled for surgery this week but wants to change care to us. \par \par 9/8/21: NFI: Bilat MMG: FG density, stable right SA nodule. \par 9/8/21 NFI: Bilat u/s, Right 8x6 mm nodule at 11:00 1N stable. Adj 3 mm nodule, likely stable. Scattered subcm cyst and FC nodules. Left" 7x3 mm 2:00 4N and 4:00 4N nodules. Bilat breast nodularity, BR3- bilat mmg and u/s. Rec comparison with prior Left mmg and bilat u/s. \par 3/25/22, Compared to 2021 studies, NFR, Bilat dMMG: FG, no susp findings, stable bilat nodularity; Bilat U/S: R no susp solid mass, stable subcm nodules at 2:00, 3:00, 6:00 and 11:00, L no susp solid mass, stable subcm nodules 2:00 and 4:00, rec mmg 1 year, BR2\par \par 6/2/23 NFR, bilat dMMG and R US: FG. New asymmetric density seen in the inferior right breast approx 5-6:00 position. This persists on spot compression views with irregular margins measuring approximately 12mm. Left breast shows no mmg abnormality. R US: A 10 mm hypoechoic mass is seen at 5:00 felt to correspond to the mmg finding. This has irregular margins and is vertically oriented. Bx is rec. BR4C\par \par 6/16/23 DOUGLAS Gao 5:00 US bx path: IDC Gr2, measuring at least 7mm. DCIS Gr2. Lymphovascular permeation not seen. ER+ 90%, [place]+ 80%. [id number] CISH negative. \par Concordant. Rec surgical or oncological management.\par \par 7/7/23 NFR, MRI: R- There are scattered enhancing nonspecific foci. There is a 2.1cm irregular mass with associated bx marker in the lower central breast, middle depth, corresponding to newly diagnosed malignancy. There is a 0.6 cm circumscribed T2 bright avidly enhancing mass in the upper slightly outer anterior breast. L- negative. Axilla- negative. Impression: Bx-proven malignancy in the lower central right breast. No MRI evidence of contralateral disease. Enhancing mass in the upper outer anterior right breast. This may represent a FA, however US-guided core bx is advised to confirm. BR4A\par \par 7/17/23 DOUGLAS Gao 11:00 7mm will circumscribed mass bx: path pending \par \par Hx of L breast reduction due to asymmetry in [place] 1999.\par Fhx: Sister w/ inoperable brain tumor at 32 y/o. Not AJ. \par

## 2023-07-19 NOTE — DATA REVIEWED
[FreeTextEntry1] : 6/2/23 NFR, bilat dMMG and R US: FG. New asymmetric density seen in the inferior right breast approx 5-6:00 position. This persists on spot compression views with irregular margins measuring approximately 12mm. Left breast shows no mmg abnormality. R US: A 10 mm hypoechoic mass is seen at 5:00 felt to correspond to the mmg finding. This has irregular margins and is vertically oriented. Bx is rec. BR4C\par \par 6/16/23 DOUGLAS Gao 5:00 US bx path: IDC Gr2, measuring at least 7mm. DCIS Gr2. Lymphovascular permeation not seen. ER+ 90%, [place]+ 80%. [id number] CISH negative. \par Concordant. Rec surgical or oncological management.\par \par 7/7/23 NFR, MRI: R- There are scattered enhancing nonspecific foci. There is a 2.1cm irregular mass with associated bx marker in the lower central breast, middle depth, corresponding to newly diagnosed malignancy. There is a 0.6 cm circumscribed T2 bright avidly enhancing mass in the upper slightly outer anterior breast. L- negative. Axilla- negative. Impression: Bx-proven malignancy in the lower central right breast. No MRI evidence of contralateral disease. Enhancing mass in the upper outer anterior right breast. This may represent a FA, however US-guided core bx is advised to confirm. BR4A\par \par 7/17/23 DOUGLAS Gao 11:00 7mm will circumscribed mass bx: path pending \par

## 2023-07-19 NOTE — ASSESSMENT
[FreeTextEntry1] : Referred by Viviane Garrett MD\par PCP: MATT Chau \par \par Pt is a 63 year old white female here today to review recent biopsy results. \par \par 6/2/23 NFR, bilat dMMG and R US: FG. New asymmetric density seen in the inferior right breast approx 5-6:00 position. This persists on spot compression views with irregular margins measuring approximately 12mm. Left breast shows no mmg abnormality. R US: A 10 mm hypoechoic mass is seen at 5:00 felt to correspond to the mmg finding. This has irregular margins and is vertically oriented. Bx is rec. BR4C\par \par 6/16/23 [facility], R 5:00 US bx path: IDC Gr2, measuring at least 7mm. DCIS Gr2. Lymphovascular permeation not seen. ER+ 90%, [place]+ 80%. [id number] CISH negative. \par Concordant. Rec surgical or oncological management.\par \par 7/7/23 NFR, MRI: R- There are scattered enhancing nonspecific foci. There is a 2.1cm irregular mass with associated bx marker in the lower central breast, middle depth, corresponding to newly diagnosed malignancy. There is a 0.6 cm circumscribed T2 bright avidly enhancing mass in the upper slightly outer anterior breast. L- negative. Axilla- negative. Impression: Bx-proven malignancy in the lower central right breast. No MRI evidence of contralateral disease. Enhancing mass in the upper outer anterior right breast. This may represent a FA, however US-guided core bx is advised to confirm. BR4A\par \par 7/17/23 [facility], R 11:00 7mm will circumscribed mass bx: path pending \par \par Hx of L breast reduction due to asymmetry in [place] 1999.\par Fhx: Sister w/ inoperable brain tumor at 34 y/o. Not AJ. \par CBE: C/D cup, Right no palpable lesions or masses, Core bx sites noted at 4;00 and 11:00. Left reduction scar. L>Right, No axillary or SC lymphadenopathy. \par Long discussion with patient regarding the biopsy results from [facility] from 06/16/2023 showing IDC  of the R breast at the5 o’clock, Grade 2 , ER 90% , IL 80  and Ncp0gii FISH neg , measuring 7 mm on bx, was 1.0 cm on u/s and 2.1 on MRI. DCIS also noted on bx. .  Reviewed extensively the options of mastectomy vs lumpectomy with the pros and cons for both.  \par With mastectomy, options of immediate or delayed reconstruction (expander/implant vs autologous flap) discussed and reconstruction handout given and contact for plastic surgeon. Also discussed usually no radiation with mastectomy but pending final pathology.  Discussed usually no reexcision with mastectomy. Reviewed will need drain(s) with mastectomy.\par With lumpectomy, will need radiation and treatment was reviewed.  Will need localization, localizer clip or wire. Also discussed will need negative margins and if too close or positive, may need reexcision(s).   If unable to achieve negative margins with reexcision(s), she  may need mastectomy.  LIN may be an oncoplastic surgery candidate.   The option of oncoplastic surgery was discussed.  \par Reviewed SLNBx (dye and radioactive tracer). With mastectomy if clinically suspicious, may check with frozen section intraoperatively, if positive, may proceed with ALND. Also discussed if SLN is negative on frozen or frozen not done but positive on permanent, may need delay ALND or postmastectomy radiation.  With lumpectomy, given Z-11, will not check axillary LN pathology intraoperatively if clinically not suspicious given radiation is to be given.  If suspicious, will check and pending results, may proceed to ALND Discussed risks and complications including lymphedema and handout given out.  \par Discussed probably will need hormonal therapy.  Will not need herceptin.  Need for chemotherapy pending results of final pathology. May need additional tumor analysis such as oncotype Dx to determine need for chemotherapy.\par Lin is leaning towards oncoplastic, she is bothered by the asymmetry of larger breast on the left and this asymmetry may be more pronounced with right lumpectomy. \par SHe is clinical stage 1 (per MRI its 2.1 cm so may be st 2).\par Myriad genetic testing today. \par Sozo done today.\par Await results of 11:00 bx, if positive, ? mastectomy. \par

## 2023-07-19 NOTE — PHYSICAL EXAM
[Normocephalic] : normocephalic [Atraumatic] : atraumatic [Supple] : supple [No Supraclavicular Adenopathy] : no supraclavicular adenopathy [Examined in the supine and seated position] : examined in the supine and seated position [Asymmetrical] : asymmetrical [Bra Size: ___] : Bra Size: [unfilled] [No dominant masses] : no dominant masses in right breast  [No dominant masses] : no dominant masses left breast [No Nipple Retraction] : no left nipple retraction [No Nipple Discharge] : no left nipple discharge [No Axillary Lymphadenopathy] : no left axillary lymphadenopathy [No Edema] : no edema [No Swelling] : no swelling [Full ROM] : full range of motion [No Rashes] : no rashes [No Ulceration] : no ulceration [de-identified] : L> R, known CA is not palpable. Left reduction scar but L is still greater in size compared to the right.

## 2023-07-28 ENCOUNTER — APPOINTMENT (OUTPATIENT)
Dept: OPHTHALMOLOGY | Facility: CLINIC | Age: 63
End: 2023-07-28
Payer: MEDICAID

## 2023-07-28 ENCOUNTER — NON-APPOINTMENT (OUTPATIENT)
Age: 63
End: 2023-07-28

## 2023-07-28 PROCEDURE — 92014 COMPRE OPH EXAM EST PT 1/>: CPT

## 2023-08-01 ENCOUNTER — NON-APPOINTMENT (OUTPATIENT)
Age: 63
End: 2023-08-01

## 2023-08-02 ENCOUNTER — NON-APPOINTMENT (OUTPATIENT)
Age: 63
End: 2023-08-02

## 2023-08-02 ENCOUNTER — APPOINTMENT (OUTPATIENT)
Dept: PLASTIC SURGERY | Facility: CLINIC | Age: 63
End: 2023-08-02
Payer: MEDICAID

## 2023-08-02 VITALS
HEART RATE: 80 BPM | DIASTOLIC BLOOD PRESSURE: 70 MMHG | BODY MASS INDEX: 26.36 KG/M2 | OXYGEN SATURATION: 98 % | RESPIRATION RATE: 16 BRPM | SYSTOLIC BLOOD PRESSURE: 100 MMHG | HEIGHT: 66 IN | WEIGHT: 164 LBS | TEMPERATURE: 97.3 F

## 2023-08-02 DIAGNOSIS — M35.00 SICCA SYNDROME, UNSPECIFIED: ICD-10-CM

## 2023-08-02 DIAGNOSIS — Z42.1 ENCOUNTER FOR BREAST RECONSTRUCTION FOLLOWING MASTECTOMY: ICD-10-CM

## 2023-08-02 DIAGNOSIS — B00.9 HERPESVIRAL INFECTION, UNSPECIFIED: ICD-10-CM

## 2023-08-02 DIAGNOSIS — Z82.49 FAMILY HISTORY OF ISCHEMIC HEART DISEASE AND OTHER DISEASES OF THE CIRCULATORY SYSTEM: ICD-10-CM

## 2023-08-02 PROCEDURE — 99215 OFFICE O/P EST HI 40 MIN: CPT

## 2023-08-02 RX ORDER — ESCITALOPRAM OXALATE 20 MG/1
20 TABLET, FILM COATED ORAL
Refills: 0 | Status: DISCONTINUED | COMMUNITY
End: 2023-08-02

## 2023-08-02 RX ORDER — LORATADINE 10 MG/1
TABLET ORAL
Refills: 0 | Status: DISCONTINUED | COMMUNITY
End: 2023-08-02

## 2023-08-02 RX ORDER — PREDNISOLONE ACETATE 10 MG/ML
1 SUSPENSION/ DROPS OPHTHALMIC
Qty: 5 | Refills: 0 | Status: DISCONTINUED | COMMUNITY
Start: 2020-12-16 | End: 2023-08-02

## 2023-08-02 RX ORDER — ESCITALOPRAM OXALATE 20 MG/1
20 TABLET ORAL
Qty: 30 | Refills: 0 | Status: DISCONTINUED | COMMUNITY
Start: 2021-04-19 | End: 2023-08-02

## 2023-08-04 ENCOUNTER — NON-APPOINTMENT (OUTPATIENT)
Age: 63
End: 2023-08-04

## 2023-08-04 PROBLEM — Z42.1 ENCOUNTER FOR BREAST RECONSTRUCTION FOLLOWING MASTECTOMY: Status: ACTIVE | Noted: 2023-08-04

## 2023-08-10 ENCOUNTER — APPOINTMENT (OUTPATIENT)
Dept: HEMATOLOGY ONCOLOGY | Facility: CLINIC | Age: 63
End: 2023-08-10

## 2023-08-10 PROBLEM — B00.9 HERPES: Status: ACTIVE | Noted: 2023-08-02

## 2023-08-10 PROBLEM — M35.00 SJOGREN'S DISEASE: Status: ACTIVE | Noted: 2023-08-10

## 2023-08-10 RX ORDER — HYDROXYCHLOROQUINE SULFATE 200 MG/1
TABLET ORAL
Refills: 0 | Status: ACTIVE | COMMUNITY

## 2023-08-10 NOTE — REASON FOR VISIT
[Consultation] : a consultation visit [FreeTextEntry1] : Patient states a year and a half ago she felt a lump and then in her recent annual mammogram came up she found out it was cancerous.

## 2023-08-10 NOTE — HISTORY OF PRESENT ILLNESS
[FreeTextEntry1] : This is a 62 yo woman referred for evaluation for reconstruction after partial mastectomy. Prior mammograms had shown stable nodules, but a new, asymmetric density was seen on her MMG in June. Biopsy revealed a right invasive ductal cancer (7-12 mm at the 5:00 position on US and 2.1 cm on MRI) as well as DCIS. MRI also showed a 6 mm enhancing mass in the upper outer quadrant. Biopsy of this area was benign. Pt reports she had a left breast reduction for asymmetry about 30 years ago, but the left side is still too big. She reports bilateral NAC sensation has always been diminished.  She was recently diagnosed with mild Sjogren's disease and was started on Plaquenil

## 2023-08-10 NOTE — PHYSICAL EXAM
[NI] : Normal [Bra Size: _______] : Bra Size: [unfilled] [de-identified] : NL respiratory effort noted  [de-identified] : Left breast with reduction scars, slightly larger than the right breast. No NAC ptosis, but bilateral glandular ptosis, left greater than right. No palpable masses [de-identified] : Excess adipose, skin laxity. Small lower abdominal pannus. Fair to good abdominal tone.

## 2023-08-10 NOTE — ASSESSMENT
[FreeTextEntry1] : We discussed breast reconstruction in general, including flap and implant based reconstruction, as well as the option of oncoplastic breast reconstruction after lumpectomy. This option seems to fit with pt's needs and expectations best, given the size of her breasts as well as her ongoing asymmetry. She would be a good candidate for right oncoplastic reconstruction and left breast revision reduction for symmetry.  We reviewed the R/B/A of the procedure, including, but not limited to, the risks of decreased sensation or loss of sensation of the NAC, partial or complete NAC necrosis, wound healing problems requiring wound care, especially at the T-junction points of the incisions. I went over the different scar shapes and their positions on the breasts.  We also reviewed risks of surgery in general (bleeding or hematoma requiring a return to the operating room, and infection).  We also went over the potential for a loss of volume of the involved breast after radiation, and how this cannot be precisely predicted preoperatively, and may require further surgery to obtain symmetry.  She was given an opportunity to ask questions and all of her questions were answered. She wishes to proceed.

## 2023-08-17 ENCOUNTER — NON-APPOINTMENT (OUTPATIENT)
Age: 63
End: 2023-08-17

## 2023-08-21 ENCOUNTER — NON-APPOINTMENT (OUTPATIENT)
Age: 63
End: 2023-08-21

## 2023-08-21 ENCOUNTER — RESULT REVIEW (OUTPATIENT)
Age: 63
End: 2023-08-21

## 2023-08-21 ENCOUNTER — APPOINTMENT (OUTPATIENT)
Dept: ULTRASOUND IMAGING | Facility: CLINIC | Age: 63
End: 2023-08-21
Payer: MEDICAID

## 2023-08-21 PROCEDURE — 19285Z: CUSTOM

## 2023-08-21 PROCEDURE — 77065 DX MAMMO INCL CAD UNI: CPT | Mod: RT

## 2023-08-24 ENCOUNTER — RESULT REVIEW (OUTPATIENT)
Age: 63
End: 2023-08-24

## 2023-08-24 ENCOUNTER — APPOINTMENT (OUTPATIENT)
Dept: BREAST CENTER | Facility: HOSPITAL | Age: 63
End: 2023-08-24

## 2023-08-24 ENCOUNTER — APPOINTMENT (OUTPATIENT)
Dept: PLASTIC SURGERY | Facility: HOSPITAL | Age: 63
End: 2023-08-24
Payer: MEDICAID

## 2023-08-24 PROCEDURE — 19318 BREAST REDUCTION: CPT | Mod: RT

## 2023-08-30 ENCOUNTER — APPOINTMENT (OUTPATIENT)
Dept: PLASTIC SURGERY | Facility: CLINIC | Age: 63
End: 2023-08-30
Payer: MEDICAID

## 2023-08-30 VITALS
BODY MASS INDEX: 26.03 KG/M2 | OXYGEN SATURATION: 96 % | SYSTOLIC BLOOD PRESSURE: 120 MMHG | TEMPERATURE: 97.2 F | HEART RATE: 80 BPM | WEIGHT: 162 LBS | RESPIRATION RATE: 16 BRPM | DIASTOLIC BLOOD PRESSURE: 80 MMHG | HEIGHT: 66 IN

## 2023-08-30 PROCEDURE — 99024 POSTOP FOLLOW-UP VISIT: CPT

## 2023-08-30 NOTE — PHYSICAL EXAM
[NI] : Normal [de-identified] : NL respiratory effort noted  [de-identified] : Bilateral breasts with mild edema and minimal ecchymosis. Bilateral NACs viable and sensate. Steristrips intact. Right axilla incision appears irritated, but intact.  [de-identified] : Soft, nontender, not distended. Excess adipose with significant laxity. No induration.

## 2023-08-30 NOTE — ASSESSMENT
[FreeTextEntry1] : Doing very well postop. May use her own bras, just no wires. Trim or remove steristrips as they start to fall off. Follow up in three weeks. Pt has her appointment set up with Dr. Damon next week.

## 2023-08-30 NOTE — REASON FOR VISIT
[Follow-Up: _____] : a [unfilled] follow-up visit [FreeTextEntry1] : Patient states she is doing great, she is currently bloated and not constipated

## 2023-08-30 NOTE — HISTORY OF PRESENT ILLNESS
[FreeTextEntry1] : Pt called last night saying that her chest was great, but her abdomen felt like she had "swallowed a watermelon". She had initially been taking just the oxycodone for pain. She took some dulcolax, but this made her feel crampy. She is also just wondering if she is just seeing her abdominal rolls more and "hates" them. She did have [employer] in the past, in multiple areas of the abdomen and is wondering if that did anything, or made it worse.

## 2023-09-01 ENCOUNTER — NON-APPOINTMENT (OUTPATIENT)
Age: 63
End: 2023-09-01

## 2023-09-07 ENCOUNTER — APPOINTMENT (OUTPATIENT)
Dept: BREAST CENTER | Facility: CLINIC | Age: 63
End: 2023-09-07
Payer: MEDICAID

## 2023-09-07 VITALS
TEMPERATURE: 97.5 F | WEIGHT: 162 LBS | BODY MASS INDEX: 26.03 KG/M2 | HEART RATE: 72 BPM | HEIGHT: 66 IN | SYSTOLIC BLOOD PRESSURE: 103 MMHG | DIASTOLIC BLOOD PRESSURE: 65 MMHG

## 2023-09-07 PROCEDURE — 99024 POSTOP FOLLOW-UP VISIT: CPT

## 2023-09-07 NOTE — PROCEDURE
[FreeTextEntry1] : aspiration of axillary seroma [FreeTextEntry2] : symptomatic axillary seroma. [FreeTextEntry3] : Procedure for aspiration was discussed with patient and consent was signed.  The R axilla prepped with alcohol.  A 21 gauge needle used for aspiration in seroma and 59 ml of serous fluid aspirated and discarded.  The patient tolerated the procedure well and hemostasis was excellent. A bandage was placed over the site.  No  evidence of infection.

## 2023-09-07 NOTE — PHYSICAL EXAM
[de-identified] : Bilat oncoplastic inc healed well. Right axillary seroma. No evidence of cellulitis.

## 2023-09-07 NOTE — ASSESSMENT
[FreeTextEntry1] : PCP: MATT Chau  Pt is a 63 year old, Myriad panel negative +VUS in MSH2 gene, white female here today for post op visit s/p 8/24/23 R lumpectomy and SLNBx with oncoplastics per Dr. Santiago.  Here with her , [place]. Doing well postop, but complaining of swelling in right axilla.  8/24/23 Surgical path: R- Multifocal IDC Gr2, largest foci 10mm, invasive tumor present at inked posterior margin however addl posterior margin shows proliferative and focal proliferative change with promient adenosis, apocrine metaplasia and microcalcs. DCIS Gr2, closest margin posterior <1mm. 0/3 LNs negative. ER+ 81-90%, RI+ 71-80% [id number] CISH negative. pT1bN0/3. L- unremarkable skin and breast tissue.  Posterior margin is at pectoralis.   6/2/23 NFR, bilat dMMG and R US: FG. New asymmetric density seen in the inferior right breast approx 5-6:00 position. This persists on spot compression views with irregular margins measuring approximately 12mm. Left breast shows no mmg abnormality. R US: A 10 mm hypoechoic mass is seen at 5:00 felt to correspond to the mmg finding. This has irregular margins and is vertically oriented. Bx is rec. BR4C  6/16/23 DOUGLAS Gao 5:00 US bx path: IDC Gr2, measuring at least 7mm. DCIS Gr2. Lymphovascular permeation not seen. ER+ 90%, RI+ 80%. [id number] CISH negative. Concordant. Rec surgical or oncological management.  7/7/23 NFR, MRI: R- There are scattered enhancing nonspecific foci. There is a 2.1cm irregular mass with associated bx marker in the lower central breast, middle depth, corresponding to newly diagnosed malignancy. There is a 0.6 cm circumscribed T2 bright avidly enhancing mass in the upper slightly outer anterior breast. L- negative. Axilla- negative. Impression: Bx-proven malignancy in the lower central right breast. No MRI evidence of contralateral disease. Enhancing mass in the upper outer anterior right breast. This may represent a FA, however US-guided core bx is advised to confirm. BR4A  7/17/23 DOUGLAS Gao 11:00 7mm will circumscribed mass bx: Fibroadenomatoid change. Benign and Concordant.   Hx of L breast reduction due to asymmetry in [place] 1999.  Fhx: Sister w/ inoperable brain tumor at 32 y/o. Not AJ.  CBE: Bilat oncoplastic inc healed well. Right axillary seroma. No evidence of cellulitis.  Asp of axillary seroma. 59 ml of serous clear fluid, discarded.  Reviewed with pt and , surgical path: T1b (1 cm largest, multifocal IDC), N0/3,M0, ER/RI positive, Her2 neg. Posterior margin was positive on lumpectomy but additional posterior margin was removed to pectoralis.  No further surgery needed. Oncotype reviewed and is pending.   PLAN:  Oncotype F.u 3 weeks. BUCKZO next visit.  F/u with Dr. Santiago as scheduled.

## 2023-09-07 NOTE — CONSULT LETTER
[Dear  ___] : Dear ~ANDREINA, [Courtesy Letter:] : I had the pleasure of seeing your patient, [unfilled], in my office today. [Please see my note below.] : Please see my note below. [Consult Closing:] : Thank you very much for allowing me to participate in the care of this patient.  If you have any questions, please do not hesitate to contact me. [Sincerely,] : Sincerely, [FreeTextEntry3] : Heidi Damon MD

## 2023-09-19 ENCOUNTER — APPOINTMENT (OUTPATIENT)
Dept: PLASTIC SURGERY | Facility: CLINIC | Age: 63
End: 2023-09-19
Payer: SELF-PAY

## 2023-09-19 VITALS
DIASTOLIC BLOOD PRESSURE: 70 MMHG | HEIGHT: 66 IN | HEART RATE: 79 BPM | OXYGEN SATURATION: 96 % | RESPIRATION RATE: 16 BRPM | SYSTOLIC BLOOD PRESSURE: 113 MMHG | TEMPERATURE: 98 F | WEIGHT: 164 LBS | BODY MASS INDEX: 26.36 KG/M2

## 2023-09-19 PROCEDURE — 64612 DESTROY NERVE FACE MUSCLE: CPT

## 2023-10-03 ENCOUNTER — APPOINTMENT (OUTPATIENT)
Dept: BREAST CENTER | Facility: CLINIC | Age: 63
End: 2023-10-03
Payer: MEDICAID

## 2023-10-03 VITALS
WEIGHT: 163 LBS | DIASTOLIC BLOOD PRESSURE: 65 MMHG | HEIGHT: 66 IN | HEART RATE: 66 BPM | BODY MASS INDEX: 26.2 KG/M2 | SYSTOLIC BLOOD PRESSURE: 103 MMHG | TEMPERATURE: 96.5 F

## 2023-10-03 PROCEDURE — 93702 BIS XTRACELL FLUID ANALYSIS: CPT | Mod: NC

## 2023-10-03 PROCEDURE — 99024 POSTOP FOLLOW-UP VISIT: CPT

## 2023-10-10 ENCOUNTER — NON-APPOINTMENT (OUTPATIENT)
Age: 63
End: 2023-10-10

## 2023-11-27 ENCOUNTER — OUTPATIENT (OUTPATIENT)
Dept: OUTPATIENT SERVICES | Facility: HOSPITAL | Age: 63
LOS: 1 days | End: 2023-11-27

## 2023-11-27 DIAGNOSIS — C50.919 MALIGNANT NEOPLASM OF UNSPECIFIED SITE OF UNSPECIFIED FEMALE BREAST: ICD-10-CM

## 2023-12-01 ENCOUNTER — APPOINTMENT (OUTPATIENT)
Dept: HEMATOLOGY ONCOLOGY | Facility: CLINIC | Age: 63
End: 2023-12-01
Payer: MEDICAID

## 2023-12-01 VITALS
WEIGHT: 169.6 LBS | TEMPERATURE: 98.1 F | SYSTOLIC BLOOD PRESSURE: 126 MMHG | DIASTOLIC BLOOD PRESSURE: 83 MMHG | HEART RATE: 69 BPM | OXYGEN SATURATION: 99 % | BODY MASS INDEX: 27.26 KG/M2 | HEIGHT: 66 IN

## 2023-12-01 PROCEDURE — 99205 OFFICE O/P NEW HI 60 MIN: CPT

## 2023-12-01 RX ORDER — NAPROXEN 500 MG/1
500 TABLET ORAL
Qty: 30 | Refills: 0 | Status: DISCONTINUED | COMMUNITY
Start: 2020-12-22 | End: 2023-12-01

## 2023-12-04 ENCOUNTER — APPOINTMENT (OUTPATIENT)
Dept: PLASTIC SURGERY | Facility: CLINIC | Age: 63
End: 2023-12-04
Payer: SELF-PAY

## 2023-12-04 ENCOUNTER — APPOINTMENT (OUTPATIENT)
Dept: PLASTIC SURGERY | Facility: CLINIC | Age: 63
End: 2023-12-04
Payer: MEDICAID

## 2023-12-04 VITALS
OXYGEN SATURATION: 98 % | SYSTOLIC BLOOD PRESSURE: 117 MMHG | HEIGHT: 66 IN | HEART RATE: 82 BPM | WEIGHT: 169 LBS | BODY MASS INDEX: 27.16 KG/M2 | TEMPERATURE: 97.9 F | RESPIRATION RATE: 16 BRPM | DIASTOLIC BLOOD PRESSURE: 84 MMHG

## 2023-12-04 VITALS
HEIGHT: 66 IN | TEMPERATURE: 97.9 F | OXYGEN SATURATION: 98 % | RESPIRATION RATE: 16 BRPM | BODY MASS INDEX: 27.16 KG/M2 | SYSTOLIC BLOOD PRESSURE: 117 MMHG | WEIGHT: 169 LBS | DIASTOLIC BLOOD PRESSURE: 84 MMHG | HEART RATE: 82 BPM

## 2023-12-04 DIAGNOSIS — Z85.3 PERSONAL HISTORY OF MALIGNANT NEOPLASM OF BREAST: ICD-10-CM

## 2023-12-04 DIAGNOSIS — L90.5 SCAR CONDITIONS AND FIBROSIS OF SKIN: ICD-10-CM

## 2023-12-04 DIAGNOSIS — Z17.0 PERSONAL HISTORY OF MALIGNANT NEOPLASM OF BREAST: ICD-10-CM

## 2023-12-04 PROCEDURE — 99213 OFFICE O/P EST LOW 20 MIN: CPT

## 2023-12-04 PROCEDURE — 64612 DESTROY NERVE FACE MUSCLE: CPT

## 2023-12-12 ENCOUNTER — APPOINTMENT (OUTPATIENT)
Dept: BREAST CENTER | Facility: CLINIC | Age: 63
End: 2023-12-12
Payer: MEDICAID

## 2023-12-12 VITALS
DIASTOLIC BLOOD PRESSURE: 66 MMHG | WEIGHT: 170 LBS | HEART RATE: 101 BPM | SYSTOLIC BLOOD PRESSURE: 114 MMHG | HEIGHT: 65.5 IN | BODY MASS INDEX: 27.98 KG/M2 | TEMPERATURE: 97.2 F

## 2023-12-12 DIAGNOSIS — R92.30 DENSE BREASTS, UNSPECIFIED: ICD-10-CM

## 2023-12-12 PROCEDURE — 99214 OFFICE O/P EST MOD 30 MIN: CPT

## 2023-12-18 ENCOUNTER — APPOINTMENT (OUTPATIENT)
Dept: PLASTIC SURGERY | Facility: CLINIC | Age: 63
End: 2023-12-18
Payer: SELF-PAY

## 2023-12-18 VITALS
HEART RATE: 78 BPM | DIASTOLIC BLOOD PRESSURE: 64 MMHG | BODY MASS INDEX: 27.98 KG/M2 | TEMPERATURE: 97.6 F | WEIGHT: 170 LBS | HEIGHT: 65.5 IN | SYSTOLIC BLOOD PRESSURE: 101 MMHG

## 2023-12-18 PROCEDURE — 11950J JUVEDERM: CUSTOM | Mod: NC

## 2023-12-18 NOTE — PROCEDURE
[FreeTextEntry1] : Perioral rhytids, facial aging [FreeTextEntry2] : Upper lip Juvederm [FreeTextEntry3] : Local Aesthesia ( 1 % lidocaine with 1:100,000 epinephrine), bilateral infra-orbital nerve blocks [FreeTextEntry4] : droplets [FreeTextEntry5] : none [FreeTextEntry6] : Bilateral infraorbital nerve blocks were performed with good results including the midline. Upper lip lines improved following Botox, but some creases were still present. After CHG swab prep, Juvederm ultra was injected along the white roll bilaterally up to the Cupid's bow peaks. The creased rhytids were injected intradermally in a linear fashion within the crease, and in a deep dermal plane, perpendicular to the crease in a cross-hatching pattern. The central rhytid was injected directly as well. The pt tolerated this well, especially with the block in the upper lip. No bruising or complications noted. Follow up as needed. Juvederm Ultra XC GTIN [id number] Lot [id number] Exp: 2024-5-21

## 2023-12-18 NOTE — ADDENDUM
[FreeTextEntry1] : All medical record entries were at my direction and personally dictated by me (Dr. Judith Santiago). I have reviewed the chart and agree that the record accurately reflects my personal performance of the history, physical exam, assessment and plan.

## 2023-12-18 NOTE — HISTORY OF PRESENT ILLNESS
[FreeTextEntry1] : Patient presents for [facility] filler procedure visit.  She has no complaints today.

## 2023-12-18 NOTE — PHYSICAL EXAM
[NI] : Normal [de-identified] : Bilateral breasts well healed, Scars are pink and fading on the left and pale on the right radiated side.

## 2024-01-30 RX ORDER — TAMOXIFEN CITRATE 10 MG/1
10 TABLET, FILM COATED ORAL
Qty: 30 | Refills: 1 | Status: ACTIVE | COMMUNITY
Start: 2024-01-30 | End: 1900-01-01

## 2024-01-30 RX ORDER — TAMOXIFEN CITRATE 20 MG/1
20 TABLET, FILM COATED ORAL DAILY
Qty: 30 | Refills: 5 | Status: DISCONTINUED | COMMUNITY
Start: 2023-12-22 | End: 2024-01-30

## 2024-02-27 ENCOUNTER — OUTPATIENT (OUTPATIENT)
Dept: OUTPATIENT SERVICES | Facility: HOSPITAL | Age: 64
LOS: 1 days | End: 2024-02-27

## 2024-02-27 DIAGNOSIS — C50.919 MALIGNANT NEOPLASM OF UNSPECIFIED SITE OF UNSPECIFIED FEMALE BREAST: ICD-10-CM

## 2024-02-29 ENCOUNTER — APPOINTMENT (OUTPATIENT)
Dept: OPHTHALMOLOGY | Facility: CLINIC | Age: 64
End: 2024-02-29
Payer: MEDICAID

## 2024-02-29 ENCOUNTER — NON-APPOINTMENT (OUTPATIENT)
Age: 64
End: 2024-02-29

## 2024-02-29 PROCEDURE — 92014 COMPRE OPH EXAM EST PT 1/>: CPT

## 2024-03-04 ENCOUNTER — APPOINTMENT (OUTPATIENT)
Dept: HEMATOLOGY ONCOLOGY | Facility: CLINIC | Age: 64
End: 2024-03-04
Payer: MEDICAID

## 2024-03-04 VITALS
DIASTOLIC BLOOD PRESSURE: 78 MMHG | OXYGEN SATURATION: 99 % | TEMPERATURE: 98 F | BODY MASS INDEX: 27.86 KG/M2 | SYSTOLIC BLOOD PRESSURE: 122 MMHG | HEART RATE: 79 BPM | WEIGHT: 170 LBS

## 2024-03-04 PROCEDURE — 99214 OFFICE O/P EST MOD 30 MIN: CPT

## 2024-03-04 PROCEDURE — G2211 COMPLEX E/M VISIT ADD ON: CPT | Mod: NC,1L

## 2024-03-04 RX ORDER — OXYCODONE 5 MG/1
5 TABLET ORAL EVERY 6 HOURS
Qty: 5 | Refills: 0 | Status: DISCONTINUED | COMMUNITY
Start: 2023-08-23 | End: 2024-03-04

## 2024-03-04 RX ORDER — TAMOXIFEN CITRATE 20 MG/1
20 TABLET, FILM COATED ORAL DAILY
Qty: 1 | Refills: 3 | Status: ACTIVE | COMMUNITY
Start: 2024-03-04 | End: 1900-01-01

## 2024-03-04 NOTE — RESULTS/DATA
[FreeTextEntry1] : #Right breast cancer- multifocal IDC, ER/NE+, stage IA  She is s/p R breast lumpectomy w/ L oncoplastics w/ Dr. Heidi Damon and Dr. Judith Santiago on 8/24/23- final pathology revealed multifocal IDC (1cm and 0.7cm), mod diff, LN 0/3, pT1bN0. We discussed the excellent prognosis of stage I, ER positive, NE positive, node negative breast cancer. We explained that recent data suggests that chemotherapy may be spared for many patients with this type of breast cancer (up to 85%). We discussed the use of Oncotype DX genomic profile to determine the risk of recurrence and benefit from chemotherapy based on the results of the Tailor Rx Study to better determine which patients should receive chemotherapy in addition to hormonal therapy.  Her oncotype score has resulted as 12 with 3% risk of distant recurrence at 9 years and <1% absolute chemotherapy benefit. Additional oncotype resulted as 10, with 3% risk of distant recurrence and <1% chemo benefit.  She completed RT w/ Dr. Pandey on 11/7/23 which she tolerated well.  She ultimately opted to start tamoxifen in January 2024. Developed a rash/itching, so held tamoxifen for a few weeks. Ultimately was treated for scabies (unclear if she actually had this).  Willing to restart tamoxifen at this time- will restart at 10mg dose in 1 week, and uptitrate as tolerated.  RTC 3 months for follow up.  Genetics:  [employer] multi-gene panel negative (+VUS in MSH2 gene). DEXA 12/5/23- osteopenia, takes calcium and vitamin D  I personally have spent a total of 35 minutes of time on the date of this encounter reviewing test results, documenting findings, coordinating care and directly consulting with the patient and/or designated family member. Greater than 50% of the face to face encounter time was spent on counseling and/or coordination of care for multifocal R breast cancer.

## 2024-03-04 NOTE — HISTORY OF PRESENT ILLNESS
[de-identified] : Lin presents for follow up on 3/4/24 for right breast cancer.   At today's visit she is generally feeling well. She initiated tamoxifen 20mg on 1/5/24 - developed rash/itching 2-3 weeks after starting tamoxifen. She denied changing anything else in her diet/soaps. It did not improve off of the tamoxifen so she does not believe it is related. She was seen by a dermatologist - she was treated for scabies x 2, and also took a steroid cream. Reports significant improvement now in her symptoms.  Remains off of tamoxifen at this time. Denies recent headaches, visual changes, balance issues, CP, cough, SOB, n/v/d, constipation, unintentional weight loss or new bone or back pain.  DEXA 12/5/23- osteopenia, takes calcium and vitamin D  [de-identified] : Referred by:  Dr. Heidi Damon  Breast Cancer Summary:  DIAGNOSIS: Right breast cancer  PROCEDURE AND DATE: R breast lumpectomy w/ oncoplastics & L reduction for symmetry w/ Dr. Heidi Damon and Dr. Judith Santiago 23 PATHOLOGY: multifocal IDC (1cm and 0.7cm), mod diff, LN 0/3 ER 90%, OH 80%, Her-2 equivocal, CISH negative (not amplified). STAGE: pT1bN0. POSTOPERATIVE TREATMENT: Chemotherapy: oncotype 12 and 10, chemotherapy not indicated  Radiation: completed RT w/ Dr. Pandey on 23 Hormonal: Initiated tamoxifen 20mg on 24 - developed rash/itching STATUS: JUSTINO  BRCA/Genetics STATUS: [employer] multi-gene panel negative (+VUS in MSH2 gene).  Lin Moreno is a post-menopausal female who presented at age 63 in 2023 for evaluation of right breast cancer.  The patient has a medical history of rheumatoid arthritis, sjogrens (on plaquenil).  Surgical hx:  ACL repair, breast reduction (), bunionectomy,  ()  Lin has a history of left breast reduction in  due to asymmetry (in [place]).  She has been following w/ Dr. Damon since May 2021 for abnormal breast imaging.   More recently, bilateral screening mammogram w/ US in 2023 which revealed an asymmetry in the right breast. Right breast biopsy was performed on 23 and revealed IDC, moderately differentiated, measuring at least 7mm, LVI-, ER 90%, OH 80%, Her-2 equivocal, CISH negative (not amplified). MRI of the breast on 23 revealed a 2.1cm R breast mass (biopsy proven cancer) and additional 6mm lesion. She ultimately underwent R breast lumpectomy w/ L oncoplastics w/ Dr. Heidi Damon and Dr. Judith Santiago on 23- final pathology revealed multifocal IDC (1cm and 0.7cm), mod diff, LN 0/3, pT1bN0.  Her oncotype score has resulted as 12 with 3% risk of distant recurrence at 9 years and <1% absolute chemotherapy benefit. Additional oncotype resulted as 10, with 3% risk of distant recurrence and <1% chemo benefit.  She completed RT w/ Dr. Pandey on 23. She tolerated RT well.  At today's visit she is feeling well. She is in her usual state of health. She reports a normal appetite and energy level. She has gained 20lb in the past few years. She is active at home, walks occasionally, but does not do organized physical activity. Denies recent headaches, visual changes, balance issues, CP, cough, SOB, n/v/d, constipation, unintentional weight loss or new bone or back pain.  Imaging reviewed: 23 - bilateral diagnostic mammogram with US - new asymmetry in right breast at 5-6 o'clock, biopsy recommended 23 - breast MRI - 2.1 cm irregular right breast mass corresponding to newly diagnosed malignancy with 0.6 cm circumscribed avidly enhancing lesion in the outer anterior breast. Axilla negative.  No evidence for contralateral disease.  Pathology reviewed: 23 - right breast core biopsy @ 5 o'clock - invasive, moderately differentiated ductal carcinoma w/ focal microcalcifications measuring at least 7mm, w/ DCIS (solid, intermediate grade), lymphovascular permeation not seen. ER 90%, OH 80%, Her-2 equivocal, CISH negative (not amplified) 23 - right breast core biopsy @ 11 o'clock - fibroadenomatoid changes, no atypia or malignancy notes 23 - right lumpectomy with SLNB and oncoplastic left breast reduction:  Right breast invasive ductal carcinoma, multifocal, moderately differentiated (1.0 cm and 0.7 cm, 0.1 cm satellite focus); 1.0 cm lesion involves inked posterior resected margin, w/ DCIS, biopsy site changes related to larger 1.0 cm lesion, three lymph nodes negative; left breast tissue benign. pT1bN0   Genetics:  [employer] multi-gene panel negative (+VUS in MSH2 gene).   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

## 2024-03-04 NOTE — PHYSICAL EXAM
[Fully active, able to carry on all pre-disease performance without restriction] : Status 0 - Fully active, able to carry on all pre-disease performance without restriction [Normal] : affect appropriate [de-identified] : s/p R lumpectomy w/ L breast oncoplastics, well healed, no palpable masses or LAD, mild skin peeling on R breast  [de-identified] : well appearing female, NAD, pleasant

## 2024-03-05 ENCOUNTER — RESULT REVIEW (OUTPATIENT)
Age: 64
End: 2024-03-05

## 2024-03-05 ENCOUNTER — APPOINTMENT (OUTPATIENT)
Dept: MAMMOGRAPHY | Facility: CLINIC | Age: 64
End: 2024-03-05
Payer: MEDICAID

## 2024-03-05 ENCOUNTER — NON-APPOINTMENT (OUTPATIENT)
Age: 64
End: 2024-03-05

## 2024-03-05 PROCEDURE — 77066 DX MAMMO INCL CAD BI: CPT

## 2024-03-05 PROCEDURE — G0279: CPT

## 2024-03-06 ENCOUNTER — APPOINTMENT (OUTPATIENT)
Dept: PLASTIC SURGERY | Facility: CLINIC | Age: 64
End: 2024-03-06
Payer: MEDICAID

## 2024-03-06 VITALS
TEMPERATURE: 97.2 F | BODY MASS INDEX: 28.31 KG/M2 | OXYGEN SATURATION: 97 % | WEIGHT: 172 LBS | SYSTOLIC BLOOD PRESSURE: 120 MMHG | HEART RATE: 74 BPM | HEIGHT: 65.5 IN | DIASTOLIC BLOOD PRESSURE: 80 MMHG

## 2024-03-06 DIAGNOSIS — Z09 ENCOUNTER FOR FOLLOW-UP EXAMINATION AFTER COMPLETED TREATMENT FOR CONDITIONS OTHER THAN MALIGNANT NEOPLASM: ICD-10-CM

## 2024-03-06 PROCEDURE — 99213 OFFICE O/P EST LOW 20 MIN: CPT

## 2024-03-06 NOTE — PHYSICAL EXAM
[NI] : Normal [de-identified] : Bilateral breasts well healed, Scars are pink and fading on the left and pale on the right radiated side.

## 2024-03-06 NOTE — REASON FOR VISIT
[Follow-Up: _____] : a [unfilled] follow-up visit [FreeTextEntry1] : patient states she had reconstruction of right breast and left breast reduction for symmetry on 08/2024. She states she is feeling better and has no complaints.

## 2024-03-06 NOTE — ASSESSMENT
[FreeTextEntry1] : 62 y/o female for follow up   Healed well, radiation complete 11/2/23 Follow up in April for Botox and fillers Follow up in August for one year follow up of breast surgery. All questions and concerns addressed. Plan and patient status as per Dr Santiago

## 2024-03-06 NOTE — HISTORY OF PRESENT ILLNESS
[FreeTextEntry1] : "I here to follow up for my breast surgery"  Patient presents for follow up and states that she had a normal mammogram performed yesterday.  She states that she is happy with the results of her surgery and has no complaints today.  She is interested in getting botox and filler soon as it has been awhile.

## 2024-04-15 ENCOUNTER — APPOINTMENT (OUTPATIENT)
Dept: PLASTIC SURGERY | Facility: CLINIC | Age: 64
End: 2024-04-15
Payer: SELF-PAY

## 2024-04-15 VITALS
TEMPERATURE: 97.9 F | BODY MASS INDEX: 28.48 KG/M2 | HEIGHT: 65.5 IN | SYSTOLIC BLOOD PRESSURE: 98 MMHG | WEIGHT: 173 LBS | DIASTOLIC BLOOD PRESSURE: 68 MMHG | HEART RATE: 81 BPM | OXYGEN SATURATION: 96 %

## 2024-04-15 DIAGNOSIS — Z41.1 ENCOUNTER FOR COSMETIC SURGERY: ICD-10-CM

## 2024-04-15 DIAGNOSIS — L98.8 OTHER SPECIFIED DISORDERS OF THE SKIN AND SUBCUTANEOUS TISSUE: ICD-10-CM

## 2024-04-15 DIAGNOSIS — R23.8 OTHER SKIN CHANGES: ICD-10-CM

## 2024-04-15 PROCEDURE — 64612 DESTROY NERVE FACE MUSCLE: CPT

## 2024-04-15 NOTE — REASON FOR VISIT
[Procedure: _________] : a [unfilled] procedure visit [FreeTextEntry1] : patient is here for botox injections.

## 2024-04-15 NOTE — PROCEDURE
[FreeTextEntry1] : Aging face, forehead, and crow's feet rhytids [FreeTextEntry2] : Botulinum toxin injection of forehead and crow's feet [FreeTextEntry4] : none [FreeTextEntry3] : none [FreeTextEntry5] : none [FreeTextEntry6] :  After careful CHG prep, the forehead, and crow's feet were treated. Forehead at 12 sites, split in 7 sites across the mid forehead and 5 sites above that.  Crow's feet 3 sites each side. Total of about 33 units. Lot # [id number]   Exp  3/2026  Pt tolerated the procedure well.  No significant bleeding or bruising.

## 2024-05-28 ENCOUNTER — OUTPATIENT (OUTPATIENT)
Dept: OUTPATIENT SERVICES | Facility: HOSPITAL | Age: 64
LOS: 1 days | End: 2024-05-28

## 2024-05-28 DIAGNOSIS — C50.919 MALIGNANT NEOPLASM OF UNSPECIFIED SITE OF UNSPECIFIED FEMALE BREAST: ICD-10-CM

## 2024-06-03 ENCOUNTER — APPOINTMENT (OUTPATIENT)
Dept: HEMATOLOGY ONCOLOGY | Facility: CLINIC | Age: 64
End: 2024-06-03
Payer: MEDICAID

## 2024-06-03 VITALS
BODY MASS INDEX: 27.82 KG/M2 | HEART RATE: 82 BPM | SYSTOLIC BLOOD PRESSURE: 117 MMHG | TEMPERATURE: 98 F | RESPIRATION RATE: 16 BRPM | HEIGHT: 65.5 IN | OXYGEN SATURATION: 93 % | WEIGHT: 169 LBS | DIASTOLIC BLOOD PRESSURE: 79 MMHG

## 2024-06-03 PROCEDURE — G2211 COMPLEX E/M VISIT ADD ON: CPT | Mod: NC

## 2024-06-03 PROCEDURE — 99214 OFFICE O/P EST MOD 30 MIN: CPT

## 2024-06-03 NOTE — PHYSICAL EXAM
[Fully active, able to carry on all pre-disease performance without restriction] : Status 0 - Fully active, able to carry on all pre-disease performance without restriction [Normal] : affect appropriate [de-identified] : well appearing female, NAD, pleasant  [de-identified] : s/p R lumpectomy w/ L breast oncoplastics, well healed, no palpable masses or LAD

## 2024-06-03 NOTE — BEGINNING OF VISIT
[0] : 2) Feeling down, depressed, or hopeless: Not at all (0) [PHQ-2 Negative] : PHQ-2 Negative [[id number]] : 0 [Date Discussed (MM/DD/YY): ___] :  Discussed: [unfilled] [With Patient/Caregiver] : with Patient/Caregiver

## 2024-06-03 NOTE — PHYSICAL EXAM
[Fully active, able to carry on all pre-disease performance without restriction] : Status 0 - Fully active, able to carry on all pre-disease performance without restriction [Normal] : affect appropriate [de-identified] : well appearing female, NAD, pleasant  [de-identified] : s/p R lumpectomy w/ L breast oncoplastics, well healed, no palpable masses or LAD

## 2024-06-03 NOTE — RESULTS/DATA
[FreeTextEntry1] : #Right breast cancer- multifocal IDC, ER/PA+, stage IA  She is s/p R breast lumpectomy w/ L oncoplastics w/ Dr. Heidi Damon and Dr. Judith Santiago on 8/24/23- final pathology revealed multifocal IDC (1cm and 0.7cm), mod diff, LN 0/3, pT1bN0. We discussed the excellent prognosis of stage I, ER positive, PA positive, node negative breast cancer. We explained that recent data suggests that chemotherapy may be spared for many patients with this type of breast cancer (up to 85%). We discussed the use of Oncotype DX genomic profile to determine the risk of recurrence and benefit from chemotherapy based on the results of the Tailor Rx Study to better determine which patients should receive chemotherapy in addition to hormonal therapy.  Her oncotype score has resulted as 12 with 3% risk of distant recurrence at 9 years and <1% absolute chemotherapy benefit. Additional oncotype resulted as 10, with 3% risk of distant recurrence and <1% chemo benefit.  She completed RT w/ Dr. Pandey on 11/7/23 which she tolerated well.  She ultimately opted to start tamoxifen in January 2024.  Resumed tamoxifen 20mg daily without any issue. Continue for 5-7 years. Tolerating well- denies hot flashes, stable joint pains from RA.  Genetic screen: [employer] multi-gene panel negative (+VUS in MSH2 gene) - refer to genetics  DEXA: 12/5/23- osteopenia, on vitamin D and calcium  SOZO today.   I personally have spent a total of 35 minutes of time on the date of this encounter reviewing test results, documenting findings, coordinating care and directly consulting with the patient and/or designated family member. Greater than 50% of the face to face encounter time was spent on counseling and/or coordination of care for multifocal R breast cancer.

## 2024-06-03 NOTE — BEGINNING OF VISIT
[0] : 2) Feeling down, depressed, or hopeless: Not at all (0) [PHQ-2 Negative] : PHQ-2 Negative [[id number]] : 0 [Date Discussed (MM/DD/YY): ___] :  Discussed: [unfilled] [With Patient/Caregiver] : with Patient/Caregiver

## 2024-06-03 NOTE — HISTORY OF PRESENT ILLNESS
[de-identified] : Referred by:  Dr. Heidi Damon  Breast Cancer Summary:  DIAGNOSIS: Right breast cancer  PROCEDURE AND DATE: R breast lumpectomy w/ oncoplastics & L reduction for symmetry w/ Dr. Heidi Damon and Dr. Judith Santiago 23 PATHOLOGY: multifocal IDC (1cm and 0.7cm), mod diff, LN 0/3 ER 90%, AZ 80%, Her-2 equivocal, CISH negative (not amplified). STAGE: pT1bN0. POSTOPERATIVE TREATMENT: Chemotherapy: oncotype 12 and 10, chemotherapy not indicated  Radiation: completed RT w/ Dr. Pandey on 23 Hormonal: Initiated tamoxifen 20mg on 24 - developed rash/itching STATUS: JUSTINO  BRCA/Genetics STATUS: [employer] multi-gene panel negative (+VUS in MSH2 gene).  Lin Moreno is a post-menopausal female who presented at age 63 in 2023 for evaluation of right breast cancer.  The patient has a medical history of rheumatoid arthritis, sjogrens (on plaquenil).  Surgical hx:  ACL repair, breast reduction (), bunionectomy,  ()  Lin has a history of left breast reduction in  due to asymmetry (in [place]).  She has been following w/ Dr. Damon since May 2021 for abnormal breast imaging.   More recently, bilateral screening mammogram w/ US in 2023 which revealed an asymmetry in the right breast. Right breast biopsy was performed on 23 and revealed IDC, moderately differentiated, measuring at least 7mm, LVI-, ER 90%, AZ 80%, Her-2 equivocal, CISH negative (not amplified). MRI of the breast on 23 revealed a 2.1cm R breast mass (biopsy proven cancer) and additional 6mm lesion. She ultimately underwent R breast lumpectomy w/ L oncoplastics w/ Dr. Heidi Damon and Dr. Judith Santiago on 23- final pathology revealed multifocal IDC (1cm and 0.7cm), mod diff, LN 0/3, pT1bN0.  Her oncotype score has resulted as 12 with 3% risk of distant recurrence at 9 years and <1% absolute chemotherapy benefit. Additional oncotype resulted as 10, with 3% risk of distant recurrence and <1% chemo benefit.  She completed RT w/ Dr. Pandey on 23. She tolerated RT well.  At today's visit she is feeling well. She is in her usual state of health. She reports a normal appetite and energy level. She has gained 20lb in the past few years. She is active at home, walks occasionally, but does not do organized physical activity. Denies recent headaches, visual changes, balance issues, CP, cough, SOB, n/v/d, constipation, unintentional weight loss or new bone or back pain.  Imaging reviewed: 23 - bilateral diagnostic mammogram with US - new asymmetry in right breast at 5-6 o'clock, biopsy recommended 23 - breast MRI - 2.1 cm irregular right breast mass corresponding to newly diagnosed malignancy with 0.6 cm circumscribed avidly enhancing lesion in the outer anterior breast. Axilla negative.  No evidence for contralateral disease.  Pathology reviewed: 23 - right breast core biopsy @ 5 o'clock - invasive, moderately differentiated ductal carcinoma w/ focal microcalcifications measuring at least 7mm, w/ DCIS (solid, intermediate grade), lymphovascular permeation not seen. ER 90%, AZ 80%, Her-2 equivocal, CISH negative (not amplified) 23 - right breast core biopsy @ 11 o'clock - fibroadenomatoid changes, no atypia or malignancy notes 23 - right lumpectomy with SLNB and oncoplastic left breast reduction:  Right breast invasive ductal carcinoma, multifocal, moderately differentiated (1.0 cm and 0.7 cm, 0.1 cm satellite focus); 1.0 cm lesion involves inked posterior resected margin, w/ DCIS, biopsy site changes related to larger 1.0 cm lesion, three lymph nodes negative; left breast tissue benign. 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   At today's visit she is feeling very well. Restarted tamoxifen at 10mg daily in March 2024, uptitrated and now taking 20mg daily without any issues.  She denies hot flashes, vaginal dryness or discharge or joint pains. Has lots of plans to have family over this summer in [place].  Denies recent headaches, visual changes, balance issues, CP, cough, SOB, n/v/d, constipation, unintentional weight loss or new bone or back pain.  DEXA 12/5/23- osteopenia, takes calcium and vitamin D  Health Care Maintenance: Updated 6/3/24 Mammogram: 3/5/24- BIRADS 2  Colonoscopy: never done, will discuss doing cologuard again  Gyn/Pap: December 2023- normal, UTD  Dentist: UTD  PCP: Dr. Mandujano, [facility], follows every 3 months  Dermatology screen: UTD, goes annually in November  Genetic screen: [employer] multi-gene panel negative (+VUS in MSH2 gene) - refer to genetics  DEXA: 12/5/23- osteopenia, on vitamin D and calcium  Lung cancer screen: n/a, never smoker  SOZO: DUE, last done 10/2023 - repeat SOZO today

## 2024-06-03 NOTE — RESULTS/DATA
[FreeTextEntry1] : #Right breast cancer- multifocal IDC, ER/TX+, stage IA  She is s/p R breast lumpectomy w/ L oncoplastics w/ Dr. Heidi Damon and Dr. Judith Santiago on 8/24/23- final pathology revealed multifocal IDC (1cm and 0.7cm), mod diff, LN 0/3, pT1bN0. We discussed the excellent prognosis of stage I, ER positive, TX positive, node negative breast cancer. We explained that recent data suggests that chemotherapy may be spared for many patients with this type of breast cancer (up to 85%). We discussed the use of Oncotype DX genomic profile to determine the risk of recurrence and benefit from chemotherapy based on the results of the Tailor Rx Study to better determine which patients should receive chemotherapy in addition to hormonal therapy.  Her oncotype score has resulted as 12 with 3% risk of distant recurrence at 9 years and <1% absolute chemotherapy benefit. Additional oncotype resulted as 10, with 3% risk of distant recurrence and <1% chemo benefit.  She completed RT w/ Dr. Pandey on 11/7/23 which she tolerated well.  She ultimately opted to start tamoxifen in January 2024.  Resumed tamoxifen 20mg daily without any issue. Continue for 5-7 years. Tolerating well- denies hot flashes, stable joint pains from RA.  Genetic screen: [employer] multi-gene panel negative (+VUS in MSH2 gene) - refer to genetics  DEXA: 12/5/23- osteopenia, on vitamin D and calcium  SOZO today.   I personally have spent a total of 35 minutes of time on the date of this encounter reviewing test results, documenting findings, coordinating care and directly consulting with the patient and/or designated family member. Greater than 50% of the face to face encounter time was spent on counseling and/or coordination of care for multifocal R breast cancer.

## 2024-06-03 NOTE — HISTORY OF PRESENT ILLNESS
[de-identified] : Referred by:  Dr. Heidi Damon  Breast Cancer Summary:  DIAGNOSIS: Right breast cancer  PROCEDURE AND DATE: R breast lumpectomy w/ oncoplastics & L reduction for symmetry w/ Dr. Heidi Damon and Dr. Judith Santiago 23 PATHOLOGY: multifocal IDC (1cm and 0.7cm), mod diff, LN 0/3 ER 90%, AK 80%, Her-2 equivocal, CISH negative (not amplified). STAGE: pT1bN0. POSTOPERATIVE TREATMENT: Chemotherapy: oncotype 12 and 10, chemotherapy not indicated  Radiation: completed RT w/ Dr. Pandey on 23 Hormonal: Initiated tamoxifen 20mg on 24 - developed rash/itching STATUS: JUSTINO  BRCA/Genetics STATUS: [employer] multi-gene panel negative (+VUS in MSH2 gene).  Lin Moreno is a post-menopausal female who presented at age 63 in 2023 for evaluation of right breast cancer.  The patient has a medical history of rheumatoid arthritis, sjogrens (on plaquenil).  Surgical hx:  ACL repair, breast reduction (), bunionectomy,  ()  Lin has a history of left breast reduction in  due to asymmetry (in [place]).  She has been following w/ Dr. Damon since May 2021 for abnormal breast imaging.   More recently, bilateral screening mammogram w/ US in 2023 which revealed an asymmetry in the right breast. Right breast biopsy was performed on 23 and revealed IDC, moderately differentiated, measuring at least 7mm, LVI-, ER 90%, AK 80%, Her-2 equivocal, CISH negative (not amplified). MRI of the breast on 23 revealed a 2.1cm R breast mass (biopsy proven cancer) and additional 6mm lesion. She ultimately underwent R breast lumpectomy w/ L oncoplastics w/ Dr. Heidi Damon and Dr. Judith Santiago on 23- final pathology revealed multifocal IDC (1cm and 0.7cm), mod diff, LN 0/3, pT1bN0.  Her oncotype score has resulted as 12 with 3% risk of distant recurrence at 9 years and <1% absolute chemotherapy benefit. Additional oncotype resulted as 10, with 3% risk of distant recurrence and <1% chemo benefit.  She completed RT w/ Dr. Pandey on 23. She tolerated RT well.  At today's visit she is feeling well. She is in her usual state of health. She reports a normal appetite and energy level. She has gained 20lb in the past few years. She is active at home, walks occasionally, but does not do organized physical activity. Denies recent headaches, visual changes, balance issues, CP, cough, SOB, n/v/d, constipation, unintentional weight loss or new bone or back pain.  Imaging reviewed: 23 - bilateral diagnostic mammogram with US - new asymmetry in right breast at 5-6 o'clock, biopsy recommended 23 - breast MRI - 2.1 cm irregular right breast mass corresponding to newly diagnosed malignancy with 0.6 cm circumscribed avidly enhancing lesion in the outer anterior breast. Axilla negative.  No evidence for contralateral disease.  Pathology reviewed: 23 - right breast core biopsy @ 5 o'clock - invasive, moderately differentiated ductal carcinoma w/ focal microcalcifications measuring at least 7mm, w/ DCIS (solid, intermediate grade), lymphovascular permeation not seen. ER 90%, AK 80%, Her-2 equivocal, CISH negative (not amplified) 23 - right breast core biopsy @ 11 o'clock - fibroadenomatoid changes, no atypia or malignancy notes 23 - right lumpectomy with SLNB and oncoplastic left breast reduction:  Right breast invasive ductal carcinoma, multifocal, moderately differentiated (1.0 cm and 0.7 cm, 0.1 cm satellite focus); 1.0 cm lesion involves inked posterior resected margin, w/ DCIS, biopsy site changes related to larger 1.0 cm lesion, three lymph nodes negative; left breast tissue benign. 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   At today's visit she is feeling very well. Restarted tamoxifen at 10mg daily in March 2024, uptitrated and now taking 20mg daily without any issues.  She denies hot flashes, vaginal dryness or discharge or joint pains. Has lots of plans to have family over this summer in [place].  Denies recent headaches, visual changes, balance issues, CP, cough, SOB, n/v/d, constipation, unintentional weight loss or new bone or back pain.  DEXA 12/5/23- osteopenia, takes calcium and vitamin D  Health Care Maintenance: Updated 6/3/24 Mammogram: 3/5/24- BIRADS 2  Colonoscopy: never done, will discuss doing cologuard again  Gyn/Pap: December 2023- normal, UTD  Dentist: UTD  PCP: Dr. Mandujano, [facility], follows every 3 months  Dermatology screen: UTD, goes annually in November  Genetic screen: Myriad multi-gene panel negative (+VUS in MSH2 gene) - refer to genetics  DEXA: 12/5/23- osteopenia, on vitamin D and calcium  Lung cancer screen: n/a, never smoker  SOZO: DUE, last done 10/2023 - repeat SOZO today

## 2024-06-11 ENCOUNTER — APPOINTMENT (OUTPATIENT)
Dept: BREAST CENTER | Facility: CLINIC | Age: 64
End: 2024-06-11
Payer: MEDICAID

## 2024-06-11 VITALS
SYSTOLIC BLOOD PRESSURE: 115 MMHG | HEIGHT: 65.5 IN | DIASTOLIC BLOOD PRESSURE: 79 MMHG | BODY MASS INDEX: 27.54 KG/M2 | HEART RATE: 74 BPM | OXYGEN SATURATION: 97 % | TEMPERATURE: 97.8 F | WEIGHT: 167.31 LBS

## 2024-06-11 DIAGNOSIS — Z17.0 MALIGNANT NEOPLASM OF LOWER-INNER QUADRANT OF RIGHT FEMALE BREAST: ICD-10-CM

## 2024-06-11 DIAGNOSIS — Z78.9 OTHER SPECIFIED HEALTH STATUS: ICD-10-CM

## 2024-06-11 DIAGNOSIS — C50.311 MALIGNANT NEOPLASM OF LOWER-INNER QUADRANT OF RIGHT FEMALE BREAST: ICD-10-CM

## 2024-06-11 PROCEDURE — 99214 OFFICE O/P EST MOD 30 MIN: CPT

## 2024-06-11 NOTE — ASSESSMENT
[FreeTextEntry1] : PCP: MATT Chau  Pt is a 64 year old, Myriad panel negative +VUS in MSH2 gene, white female here today for follow up. She is s/p 8/24/23 R lumpectomy and SLNBx with oncoplastics per Dr. Santiago.  8/24/23 Surgical path: R- Multifocal IDC Gr2, largest foci 10mm, invasive tumor present at inked posterior margin however addl posterior margin shows proliferative and focal proliferative change with prominent adenosis, apocrine metaplasia and microcalcs. DCIS Gr2, closest margin posterior <1mm. 0/3 LNs negative. ER+ 81-90%, DE+ 71-80% [id number] CISH negative. pT1bN0/3. L- unremarkable skin and breast tissue. Posterior margin is at pectoralis.  1.0cm focus Oncotype RS 12, <1% CT benefit. 7mm focus Oncotype RS results as 10. Finished RT with Dr. Pandey 11/2023. Seeing Dr. Resendiz, on tamoxifen.  6/2/23 NFR, bilat dMMG and R US: FG. New asymmetric density seen in the inferior right breast approx 5-6:00 position. This persists on spot compression views with irregular margins measuring approximately 12mm. Left breast shows no mmg abnormality. R US: A 10 mm hypoechoic mass is seen at 5:00 felt to correspond to the mmg finding. This has irregular margins and is vertically oriented. Bx is rec. BR4C  6/16/23 Sima, R 5:00 US bx path: IDC Gr2, measuring at least 7mm. DCIS Gr2. Lymphovascular permeation not seen. ER+ 90%, DE+ 80%. [id number] CISH negative. Concordant. Rec surgical or oncological management.  7/7/23 NFR, MRI: R- There are scattered enhancing nonspecific foci. There is a 2.1cm irregular mass with associated bx marker in the lower central breast, middle depth, corresponding to newly diagnosed malignancy. There is a 0.6 cm circumscribed T2 bright avidly enhancing mass in the upper slightly outer anterior breast. L- negative. Axilla- negative. Impression: Bx-proven malignancy in the lower central right breast. No MRI evidence of contralateral disease. Enhancing mass in the upper outer anterior right breast. This may represent a FA, however US-guided core bx is advised to confirm. BR4A  7/17/23 DOUGLAS Gao 11:00 7mm will circumscribed mass bx: Fibroadenomatoid change. Benign and Concordant.  3/5/24 NFR, bilat dMMG: Het dense. No susp mass, microcals or other sign of malignancy is identified. Postsurgical changes R breast noted. WALTER- grade 0. Impression: no mmg evidence of malignancy. Rec mmg in 1yr. BR2.  Hx of L breast reduction due to asymmetry in [place] 1999.  Fhx: Sister w/ inoperable brain tumor at 32 y/o. Not AJ.  CBE: Bilat oncoplastic inc well healed. Right axillary incision well healed. Full ROM and no Lymphedema Reviewed CBE and most recent mmg. Continue on tamoxifen (opted for over AI due to RA and hx of vaginal dryness). Will need SOZO at next med/onc appt as she was seen in the [place] office today. Reviewed with patient her exercise regimen. Discussed the importance of regular exercise, 300min per week, including cardiac and resistance training to overall health and also reduction of 20-30% for recurrence. Discussed virtual exercise programs, [email or web address]. Referred to Dr. Turner.   PLAN: Bilat sMMG and US 9/24, f/u in 6 mos if wnl F/u with Dr. Resendiz and Dr. Pandey as scheduled. Can get SOZO with Dr. Resendiz (last one was Oct 2023). F/u with Dr. Santiago as scheduled.

## 2024-06-11 NOTE — DATA REVIEWED
[FreeTextEntry1] : 3/5/24 NFR, bilat dMMG: Het dense. No susp mass, microcals or other sign of malignancy is identified. Postsurgical changes R breast noted. WALTER- grade 0. Impression: no mmg evidence of malignancy. Rec mmg in 1yr. BR2.

## 2024-06-11 NOTE — HISTORY OF PRESENT ILLNESS
[FreeTextEntry1] : PCP: MATT Chau  Pt is a 64 year old, Myriad panel negative +VUS in MSH2 gene, white female here today for follow up. She is s/p 8/24/23 R lumpectomy and SLNBx with oncoplastics per Dr. Santiago.  8/24/23 Surgical path: R- Multifocal IDC Gr2, largest foci 10mm, invasive tumor present at inked posterior margin however addl posterior margin shows proliferative and focal proliferative change with prominent adenosis, apocrine metaplasia and microcalcs. DCIS Gr2, closest margin posterior <1mm. 0/3 LNs negative. ER+ 81-90%, KY+ 71-80% [id number] CISH negative. pT1bN0/3. L- unremarkable skin and breast tissue. Posterior margin is at pectoralis.  1.0cm focus Oncotype RS 12, <1% CT benefit. 7mm focus Oncotype RS results as 10. Finished RT with Dr. Pandey 11/2023. Seeing Dr. Resendiz, on tamoxifen.  6/2/23 NFR, bilat dMMG and R US: FG. New asymmetric density seen in the inferior right breast approx 5-6:00 position. This persists on spot compression views with irregular margins measuring approximately 12mm. Left breast shows no mmg abnormality. R US: A 10 mm hypoechoic mass is seen at 5:00 felt to correspond to the mmg finding. This has irregular margins and is vertically oriented. Bx is rec. BR4C  6/16/23 Sima, R 5:00 US bx path: IDC Gr2, measuring at least 7mm. DCIS Gr2. Lymphovascular permeation not seen. ER+ 90%, KY+ 80%. [id number] CISH negative. Concordant. Rec surgical or oncological management.  7/7/23 NFR, MRI: R- There are scattered enhancing nonspecific foci. There is a 2.1cm irregular mass with associated bx marker in the lower central breast, middle depth, corresponding to newly diagnosed malignancy. There is a 0.6 cm circumscribed T2 bright avidly enhancing mass in the upper slightly outer anterior breast. L- negative. Axilla- negative. Impression: Bx-proven malignancy in the lower central right breast. No MRI evidence of contralateral disease. Enhancing mass in the upper outer anterior right breast. This may represent a FA, however US-guided core bx is advised to confirm. BR4A  7/17/23 DOUGLAS Gao 11:00 7mm will circumscribed mass bx: Fibroadenomatoid change. Benign and Concordant.  3/5/24 NFR, bilat dMMG: Het dense. No susp mass, microcals or other sign of malignancy is identified. Postsurgical changes R breast noted. WALTER- grade 0. Impression: no mmg evidence of malignancy. Rec mmg in 1yr. BR2.  Hx of L breast reduction due to asymmetry in [place] 1999.  Fhx: Sister w/ inoperable brain tumor at 34 y/o. Not AJ.

## 2024-06-11 NOTE — PHYSICAL EXAM
[Normocephalic] : normocephalic [Atraumatic] : atraumatic [Supple] : supple [No Supraclavicular Adenopathy] : no supraclavicular adenopathy [Examined in the supine and seated position] : examined in the supine and seated position [Symmetrical] : symmetrical [No dominant masses] : no dominant masses in right breast  [No dominant masses] : no dominant masses left breast [No Nipple Retraction] : no left nipple retraction [No Nipple Discharge] : no left nipple discharge [No Axillary Lymphadenopathy] : no left axillary lymphadenopathy [No Edema] : no edema [No Swelling] : no swelling [Full ROM] : full range of motion [No Rashes] : no rashes [No Ulceration] : no ulceration [de-identified] : Bilat oncoplastic inc well healed. [de-identified] : Right axillary incision well healed. [TextEntry] : Psych: appropriate, A&Ox3 Neuro: intact grossly, gait normal

## 2024-08-20 ENCOUNTER — APPOINTMENT (OUTPATIENT)
Dept: PLASTIC SURGERY | Facility: CLINIC | Age: 64
End: 2024-08-20
Payer: MEDICAID

## 2024-08-20 DIAGNOSIS — L90.5 SCAR CONDITIONS AND FIBROSIS OF SKIN: ICD-10-CM

## 2024-08-20 DIAGNOSIS — Z85.3 PERSONAL HISTORY OF MALIGNANT NEOPLASM OF BREAST: ICD-10-CM

## 2024-08-20 PROCEDURE — 99213 OFFICE O/P EST LOW 20 MIN: CPT

## 2024-08-20 RX ORDER — SERTRALINE HYDROCHLORIDE 50 MG/1
50 TABLET, FILM COATED ORAL
Refills: 0 | Status: ACTIVE | COMMUNITY

## 2024-08-20 NOTE — HISTORY OF PRESENT ILLNESS
[FreeTextEntry1] : Patient presents for her yearly breast follow up exam and is without complaints.  She states that she had decided to go back on Zoloft and is feeling well.  She is happy with the results of her last botox visit. She does have some pain under the breast, "like a bruise" that is sore to the touch and sore when she goes to sleep on that side.

## 2024-08-20 NOTE — PHYSICAL EXAM
[NI] : Normal [de-identified] : Tenderness at the costochondral junctions at multiple site on the right. Also tender on the left, but less so. [de-identified] : Bilateral breasts with good contour,  Scars are pink and fading on the left and pale on the right radiated side.

## 2024-08-20 NOTE — ASSESSMENT
[FreeTextEntry1] : Doing very well one year following oncoplastic breast reconstruction. Continue follow up with Dr. Damon. MMG and US due in September. Continue follow up for facial fillers and Botox if desired. Call for any questions or concerns.

## 2024-09-25 ENCOUNTER — OUTPATIENT (OUTPATIENT)
Dept: OUTPATIENT SERVICES | Facility: HOSPITAL | Age: 64
LOS: 1 days | End: 2024-09-25

## 2024-09-25 DIAGNOSIS — C50.919 MALIGNANT NEOPLASM OF UNSPECIFIED SITE OF UNSPECIFIED FEMALE BREAST: ICD-10-CM

## 2024-10-17 ENCOUNTER — APPOINTMENT (OUTPATIENT)
Dept: HEMATOLOGY ONCOLOGY | Facility: CLINIC | Age: 64
End: 2024-10-17
Payer: MEDICAID

## 2024-10-17 VITALS
DIASTOLIC BLOOD PRESSURE: 77 MMHG | BODY MASS INDEX: 26.38 KG/M2 | OXYGEN SATURATION: 98 % | WEIGHT: 161 LBS | SYSTOLIC BLOOD PRESSURE: 116 MMHG | HEART RATE: 61 BPM | TEMPERATURE: 97.9 F

## 2024-10-17 DIAGNOSIS — C50.311 MALIGNANT NEOPLASM OF LOWER-INNER QUADRANT OF RIGHT FEMALE BREAST: ICD-10-CM

## 2024-10-17 DIAGNOSIS — Z17.0 MALIGNANT NEOPLASM OF LOWER-INNER QUADRANT OF RIGHT FEMALE BREAST: ICD-10-CM

## 2024-10-17 PROCEDURE — G2211 COMPLEX E/M VISIT ADD ON: CPT | Mod: NC

## 2024-10-17 PROCEDURE — 99214 OFFICE O/P EST MOD 30 MIN: CPT

## 2024-11-18 ENCOUNTER — APPOINTMENT (OUTPATIENT)
Dept: PLASTIC SURGERY | Facility: CLINIC | Age: 64
End: 2024-11-18
Payer: SELF-PAY

## 2024-11-18 VITALS
HEIGHT: 65.5 IN | BODY MASS INDEX: 26.5 KG/M2 | SYSTOLIC BLOOD PRESSURE: 96 MMHG | WEIGHT: 161 LBS | DIASTOLIC BLOOD PRESSURE: 67 MMHG

## 2024-11-18 DIAGNOSIS — L98.8 OTHER SPECIFIED DISORDERS OF THE SKIN AND SUBCUTANEOUS TISSUE: ICD-10-CM

## 2024-11-18 DIAGNOSIS — R23.8 OTHER SKIN CHANGES: ICD-10-CM

## 2024-11-18 PROCEDURE — 64612 DESTROY NERVE FACE MUSCLE: CPT

## 2024-12-06 ENCOUNTER — APPOINTMENT (OUTPATIENT)
Dept: OPHTHALMOLOGY | Facility: CLINIC | Age: 64
End: 2024-12-06
Payer: COMMERCIAL

## 2024-12-06 ENCOUNTER — NON-APPOINTMENT (OUTPATIENT)
Age: 64
End: 2024-12-06

## 2024-12-06 PROCEDURE — 92020 GONIOSCOPY: CPT

## 2024-12-06 PROCEDURE — 99213 OFFICE O/P EST LOW 20 MIN: CPT | Mod: 25

## 2025-03-18 ENCOUNTER — RESULT REVIEW (OUTPATIENT)
Age: 65
End: 2025-03-18

## 2025-03-18 ENCOUNTER — APPOINTMENT (OUTPATIENT)
Dept: ULTRASOUND IMAGING | Facility: CLINIC | Age: 65
End: 2025-03-18
Payer: COMMERCIAL

## 2025-03-18 ENCOUNTER — APPOINTMENT (OUTPATIENT)
Dept: MAMMOGRAPHY | Facility: CLINIC | Age: 65
End: 2025-03-18
Payer: COMMERCIAL

## 2025-03-18 ENCOUNTER — NON-APPOINTMENT (OUTPATIENT)
Age: 65
End: 2025-03-18

## 2025-03-18 PROCEDURE — G0279: CPT

## 2025-03-18 PROCEDURE — 77066 DX MAMMO INCL CAD BI: CPT

## 2025-03-18 PROCEDURE — 76641 ULTRASOUND BREAST COMPLETE: CPT | Mod: 50

## 2025-05-05 ENCOUNTER — APPOINTMENT (OUTPATIENT)
Dept: PLASTIC SURGERY | Facility: CLINIC | Age: 65
End: 2025-05-05
Payer: SELF-PAY

## 2025-05-05 VITALS
SYSTOLIC BLOOD PRESSURE: 101 MMHG | TEMPERATURE: 98.1 F | HEART RATE: 88 BPM | WEIGHT: 165 LBS | DIASTOLIC BLOOD PRESSURE: 70 MMHG | HEIGHT: 65.5 IN | OXYGEN SATURATION: 96 % | BODY MASS INDEX: 27.16 KG/M2

## 2025-05-05 DIAGNOSIS — L98.8 OTHER SPECIFIED DISORDERS OF THE SKIN AND SUBCUTANEOUS TISSUE: ICD-10-CM

## 2025-05-05 DIAGNOSIS — R23.8 OTHER SKIN CHANGES: ICD-10-CM

## 2025-05-05 DIAGNOSIS — Z41.1 ENCOUNTER FOR COSMETIC SURGERY: ICD-10-CM

## 2025-05-05 PROCEDURE — 64612 DESTROY NERVE FACE MUSCLE: CPT

## 2025-05-05 RX ORDER — HYDROXYZINE PAMOATE 25 MG/1
25 CAPSULE ORAL
Qty: 60 | Refills: 0 | Status: ACTIVE | COMMUNITY
Start: 2025-02-10

## 2025-05-05 RX ORDER — AMOXICILLIN 500 MG/1
500 CAPSULE ORAL
Qty: 15 | Refills: 0 | Status: ACTIVE | COMMUNITY
Start: 2025-03-15

## 2025-05-14 ENCOUNTER — OUTPATIENT (OUTPATIENT)
Dept: OUTPATIENT SERVICES | Facility: HOSPITAL | Age: 65
LOS: 1 days | End: 2025-05-14

## 2025-05-14 DIAGNOSIS — C50.919 MALIGNANT NEOPLASM OF UNSPECIFIED SITE OF UNSPECIFIED FEMALE BREAST: ICD-10-CM

## 2025-05-15 ENCOUNTER — APPOINTMENT (OUTPATIENT)
Dept: HEMATOLOGY ONCOLOGY | Facility: CLINIC | Age: 65
End: 2025-05-15
Payer: MEDICARE

## 2025-05-15 VITALS
WEIGHT: 168 LBS | SYSTOLIC BLOOD PRESSURE: 119 MMHG | TEMPERATURE: 97.9 F | OXYGEN SATURATION: 96 % | DIASTOLIC BLOOD PRESSURE: 74 MMHG | HEART RATE: 84 BPM | BODY MASS INDEX: 27.53 KG/M2

## 2025-05-15 PROCEDURE — G2211 COMPLEX E/M VISIT ADD ON: CPT

## 2025-05-15 PROCEDURE — 99204 OFFICE O/P NEW MOD 45 MIN: CPT

## 2025-05-19 ENCOUNTER — APPOINTMENT (OUTPATIENT)
Dept: PLASTIC SURGERY | Facility: CLINIC | Age: 65
End: 2025-05-19

## 2025-05-21 ENCOUNTER — RX RENEWAL (OUTPATIENT)
Age: 65
End: 2025-05-21

## 2025-05-27 ENCOUNTER — APPOINTMENT (OUTPATIENT)
Dept: BREAST CENTER | Facility: CLINIC | Age: 65
End: 2025-05-27
Payer: MEDICARE

## 2025-05-27 VITALS
OXYGEN SATURATION: 96 % | TEMPERATURE: 97.7 F | HEIGHT: 65.5 IN | SYSTOLIC BLOOD PRESSURE: 131 MMHG | DIASTOLIC BLOOD PRESSURE: 78 MMHG | BODY MASS INDEX: 26.67 KG/M2 | HEART RATE: 89 BPM | WEIGHT: 162 LBS

## 2025-05-27 DIAGNOSIS — C50.311 MALIGNANT NEOPLASM OF LOWER-INNER QUADRANT OF RIGHT FEMALE BREAST: ICD-10-CM

## 2025-05-27 DIAGNOSIS — Z78.9 OTHER SPECIFIED HEALTH STATUS: ICD-10-CM

## 2025-05-27 DIAGNOSIS — Z17.0 MALIGNANT NEOPLASM OF LOWER-INNER QUADRANT OF RIGHT FEMALE BREAST: ICD-10-CM

## 2025-05-27 PROCEDURE — 99204 OFFICE O/P NEW MOD 45 MIN: CPT

## 2025-09-12 ENCOUNTER — NON-APPOINTMENT (OUTPATIENT)
Age: 65
End: 2025-09-12

## 2025-09-12 ENCOUNTER — APPOINTMENT (OUTPATIENT)
Dept: OPHTHALMOLOGY | Facility: CLINIC | Age: 65
End: 2025-09-12
Payer: MEDICARE

## 2025-09-12 PROCEDURE — 92004 COMPRE OPH EXAM NEW PT 1/>: CPT

## 2025-09-12 PROCEDURE — 92020 GONIOSCOPY: CPT
